# Patient Record
Sex: MALE | Race: WHITE | Employment: FULL TIME | URBAN - METROPOLITAN AREA
[De-identification: names, ages, dates, MRNs, and addresses within clinical notes are randomized per-mention and may not be internally consistent; named-entity substitution may affect disease eponyms.]

---

## 2017-01-09 ENCOUNTER — OFFICE VISIT (OUTPATIENT)
Dept: INTERNAL MEDICINE CLINIC | Facility: CLINIC | Age: 47
End: 2017-01-09

## 2017-01-09 VITALS
RESPIRATION RATE: 18 BRPM | BODY MASS INDEX: 30.31 KG/M2 | TEMPERATURE: 97.8 F | HEART RATE: 87 BPM | HEIGHT: 68 IN | WEIGHT: 200 LBS | DIASTOLIC BLOOD PRESSURE: 78 MMHG | SYSTOLIC BLOOD PRESSURE: 126 MMHG

## 2017-01-09 DIAGNOSIS — J01.90 ACUTE NON-RECURRENT SINUSITIS, UNSPECIFIED LOCATION: Primary | ICD-10-CM

## 2017-01-09 RX ORDER — GUAIFENESIN 600 MG/1
600 TABLET, EXTENDED RELEASE ORAL 2 TIMES DAILY
COMMUNITY
Start: 2017-01-09 | End: 2017-04-17

## 2017-01-09 RX ORDER — AMOXICILLIN 875 MG/1
875 TABLET, FILM COATED ORAL 2 TIMES DAILY
Qty: 14 TAB | Refills: 0 | Status: SHIPPED | OUTPATIENT
Start: 2017-01-09 | End: 2017-04-17

## 2017-01-09 NOTE — PROGRESS NOTES
Chief Complaint   Patient presents with    Sinus Pain     having some chest tightness, sinus pain, pain when he laid on his back. Pain when tryint to inhale/exhale not sure if he has pneumonia or bronchitis. . Elevated BP. 1. Have you been to the ER, urgent care clinic since your last visit? Hospitalized since your last visit? No    2. Have you seen or consulted any other health care providers outside of the [address] [place] [address] since your last visit? Include any pap smears or colon screening.  No

## 2017-01-09 NOTE — PROGRESS NOTES
HISTORY OF PRESENT ILLNESS  Felisa Bui is a 55 y.o. male. HPI  1) Sick since 12/26/16. Feeling \"tight\" - slightly better with humidifier. Rested all weekend. Chills/fatigue. Sinus pressure on New Year's Day. BP at home 155/90s for a few hours that day after taking 1 prednisone (left over from RA flare per Rheum). Orlene Asbury once yesterday. 2) BP at home has been ~132/82. When drinking, the day after:141/90. Has cut down on alcohol because trying to lose weight. Now 1-3x/week. Changed jobs - now working in [employer] at [employer] - very happy with this change. Much less stress. Wt Readings from Last 3 Encounters:   01/09/17 200 lb (90.7 kg)   09/20/16 200 lb (90.7 kg)   09/14/16 200 lb (90.7 kg)        Lab Results   Component Value Date/Time    Hemoglobin A1c 6.1 03/29/2016 08:12 AM    Hemoglobin A1c (POC) 6.0 09/14/2016 09:44 AM     Lab Results   Component Value Date/Time    Glucose 104 09/20/2016 11:19 AM       Review of Systems   Constitutional: Positive for malaise/fatigue. Negative for fever. HENT: Positive for congestion. Negative for ear pain. Respiratory: Positive for cough, sputum production and shortness of breath. Neurological: Positive for headaches. Endo/Heme/Allergies: Negative for environmental allergies. Physical Exam   Constitutional: He is oriented to person, place, and time. He appears well-developed and well-nourished. No distress. HENT:   Head: Normocephalic and atraumatic. Mouth/Throat: Oropharynx is clear and moist.   Bilateral TMs with fluid. No erythema. Nasal mucosa red, inflamed. Eyes: Conjunctivae are normal.   Neck: Neck supple. Cardiovascular: Normal rate, regular rhythm and normal heart sounds. Pulmonary/Chest: Effort normal and breath sounds normal.   Lymphadenopathy:     He has no cervical adenopathy. Neurological: He is alert and oriented to person, place, and time. Skin: Skin is warm and dry.    Nursing note and vitals reviewed. ASSESSMENT and PLAN    ICD-10-CM ICD-9-CM    1. Acute non-recurrent sinusitis, unspecified location J01.90 461.9 amoxicillin (AMOXIL) 875 mg tablet      guaiFENesin ER (MUCINEX) 600 mg ER tablet   2. Elevated blood pressure I10 401.9 Improved with decrease in alcohol consumption. Continue monitoring at home.

## 2017-01-14 DIAGNOSIS — E78.00 HYPERCHOLESTEREMIA: ICD-10-CM

## 2017-01-17 ENCOUNTER — PATIENT MESSAGE (OUTPATIENT)
Dept: INTERNAL MEDICINE CLINIC | Facility: CLINIC | Age: 47
End: 2017-01-17

## 2017-01-17 RX ORDER — LEVOFLOXACIN 500 MG/1
500 TABLET, FILM COATED ORAL DAILY
Qty: 10 TAB | Refills: 0 | Status: SHIPPED | OUTPATIENT
Start: 2017-01-17 | End: 2017-04-17

## 2017-01-23 ENCOUNTER — HOSPITAL ENCOUNTER (OUTPATIENT)
Dept: GENERAL RADIOLOGY | Age: 47
Discharge: HOME | End: 2017-01-23
Attending: PHYSICIAN ASSISTANT
Payer: COMMERCIAL

## 2017-01-23 ENCOUNTER — TELEPHONE (OUTPATIENT)
Dept: INTERNAL MEDICINE CLINIC | Facility: CLINIC | Age: 47
End: 2017-01-23

## 2017-01-23 DIAGNOSIS — R05.9 COUGH: ICD-10-CM

## 2017-01-23 DIAGNOSIS — J20.9 BRONCHOSPASM WITH BRONCHITIS, ACUTE: ICD-10-CM

## 2017-01-23 DIAGNOSIS — R05.9 COUGH: Primary | ICD-10-CM

## 2017-01-23 DIAGNOSIS — J98.01 BRONCHOSPASM: ICD-10-CM

## 2017-01-23 PROCEDURE — 71020 XR CHEST PA LAT: CPT

## 2017-01-23 RX ORDER — BUDESONIDE AND FORMOTEROL FUMARATE DIHYDRATE 160; 4.5 UG/1; UG/1
2 AEROSOL RESPIRATORY (INHALATION) 2 TIMES DAILY
Qty: 1 INHALER | Refills: 1 | Status: SHIPPED | OUTPATIENT
Start: 2017-01-23 | End: 2017-11-20

## 2017-01-23 RX ORDER — ALBUTEROL SULFATE 90 UG/1
1 AEROSOL, METERED RESPIRATORY (INHALATION)
Qty: 1 INHALER | Refills: 1 | Status: SHIPPED | OUTPATIENT
Start: 2017-01-23 | End: 2017-11-20

## 2017-01-23 NOTE — TELEPHONE ENCOUNTER
Replied to My Chart message, as that was easier for detailing suggestion for prednisone dosing. Will call if I don't hear back from him after lunch.

## 2017-01-23 NOTE — TELEPHONE ENCOUNTER
Patient is calling this morning to inform AVINASH Perea that he is not feeling better. Patient would like to speak with AVINASH Perea. Patient can be reached @[phone].

## 2017-01-23 NOTE — TELEPHONE ENCOUNTER
Called pt. He is coughing deeply and frequently on the phone. He has not been to work since Jan 6th with the exception of Jan 20th. I asked him to get a CXR today. He agrees to do a taper of prednisone (he already has the prednisone at home), and I sent in albuterol and Symbicort. F/U Thurs/Fri INI.

## 2017-04-17 ENCOUNTER — OFFICE VISIT (OUTPATIENT)
Dept: INTERNAL MEDICINE CLINIC | Facility: CLINIC | Age: 47
End: 2017-04-17

## 2017-04-17 VITALS
RESPIRATION RATE: 18 BRPM | BODY MASS INDEX: 30.58 KG/M2 | TEMPERATURE: 97.4 F | DIASTOLIC BLOOD PRESSURE: 82 MMHG | SYSTOLIC BLOOD PRESSURE: 127 MMHG | HEIGHT: 68 IN | WEIGHT: 201.8 LBS | OXYGEN SATURATION: 98 % | HEART RATE: 79 BPM

## 2017-04-17 DIAGNOSIS — J30.2 SEASONAL ALLERGIC RHINITIS, UNSPECIFIED ALLERGIC RHINITIS TRIGGER: ICD-10-CM

## 2017-04-17 DIAGNOSIS — R06.83 SNORING: ICD-10-CM

## 2017-04-17 DIAGNOSIS — S09.92XS NASAL TRAUMA, SEQUELA: ICD-10-CM

## 2017-04-17 DIAGNOSIS — R73.09 ELEVATED HEMOGLOBIN A1C: ICD-10-CM

## 2017-04-17 DIAGNOSIS — Z00.00 ANNUAL PHYSICAL EXAM: Primary | ICD-10-CM

## 2017-04-17 DIAGNOSIS — Z23 ENCOUNTER FOR IMMUNIZATION: ICD-10-CM

## 2017-04-17 LAB — HBA1C MFR BLD HPLC: 5.7 %

## 2017-04-17 NOTE — PROGRESS NOTES
HISTORY OF PRESENT ILLNESS  Estuardo Diaz is a 55 y.o. male. HPI  1) CE today. Fasting for labs. Pneumovax 23 due. Pt agrees. Today is day 92 without smoking! Alcohol 1-2x/week 8-10 drinks. Hasn't been to the gym for several months, but tracking steps. Getting 7-10 thousand steps/day. Wt Readings from Last 3 Encounters:   04/17/17 201 lb 12.8 oz (91.5 kg)   01/17/17 200 lb (90.7 kg)   01/09/17 200 lb (90.7 kg)     2) Snoring regularly. Believes he may have sleep apnea vs. Septum deviation s/p nasal trauma last year. Would like to start with ENT referral and then see Sleep Medicine if no better. Plans to try to lose weight to decrease risk of sleep apnea. Review of Systems   Constitutional: Negative for fever and weight loss. HENT: Positive for congestion. Negative for hearing loss and sore throat. Eyes: Negative for blurred vision. Respiratory: Negative for cough and shortness of breath. Cardiovascular: Negative for chest pain, palpitations and leg swelling. Gastrointestinal: Negative for abdominal pain, blood in stool, constipation, diarrhea, heartburn, melena, nausea and vomiting. Genitourinary: Negative for dysuria, frequency, hematuria and urgency. Musculoskeletal: Positive for joint pain. Negative for back pain and neck pain. Joint pain in ankles, low back (minor) with RA   Neurological: Negative for dizziness, seizures, loss of consciousness, weakness and headaches. Endo/Heme/Allergies: Positive for environmental allergies. Psychiatric/Behavioral: Negative for depression and substance abuse. The patient is not nervous/anxious and does not have insomnia. Physical Exam   Constitutional: He is oriented to person, place, and time. He appears well-developed and well-nourished. No distress. HENT:   Head: Normocephalic and atraumatic.    Right Ear: External ear normal.   Left Ear: External ear normal.   Mouth/Throat: Oropharynx is clear and moist. No oropharyngeal exudate. Nasal septum appears slightly deviated on gross exam.   Nasal mucosa pale, inflamed. Eyes: Conjunctivae are normal.   Neck: Neck supple. No JVD present. No thyromegaly present. Cardiovascular: Normal rate, regular rhythm and normal heart sounds. Pulmonary/Chest: Effort normal and breath sounds normal. No respiratory distress. Abdominal: Soft. Bowel sounds are normal. He exhibits no distension and no mass. There is no tenderness. There is no rebound and no guarding. Musculoskeletal: He exhibits no edema. Lymphadenopathy:     He has no cervical adenopathy. Neurological: He is alert and oriented to person, place, and time. Skin: Skin is warm and dry. Psychiatric: He has a normal mood and affect. His behavior is normal. Judgment and thought content normal.   Nursing note and vitals reviewed. ASSESSMENT and PLAN    ICD-10-CM ICD-9-CM    1. Annual physical exam Z00.00 V70.0 Labs as ordered previously. 2. Elevated hemoglobin A1c R73.09 790.29 AMB POC HEMOGLOBIN A1C   3. Snoring R06.83 786.09 SLEEP MEDICINE REFERRAL  Discussed risks of untreated sleep apnea including stroke. Pt notes understanding. REFERRAL TO ENT-OTOLARYNGOLOGY   4. Seasonal allergic rhinitis, unspecified allergic rhinitis trigger J30.2 477.9 OTC Flonase/Nasacort. 5. Nasal trauma, sequela S09. 92XS 908.9 REFERRAL TO ENT-OTOLARYNGOLOGY   6.  Encounter for immunization Z23 V03.89 PNEUMOCOCCAL POLYSACCHARIDE VACCINE, 23-VALENT, ADULT OR IMMUNOSUPPRESSED PT DOSE,

## 2017-04-17 NOTE — PROGRESS NOTES
Room 7    Chief Complaint   Patient presents with    Complete Physical     Patient states he is here for annual physical and is fasting     1. Have you been to the ER, urgent care clinic since your last visit? Hospitalized since your last visit? No    2. Have you seen or consulted any other health care providers outside of the [address] [place] [address] since your last visit? Include any pap smears or colon screening.  No     Health Maintenance Due   Topic Date Due    Pneumococcal 19-64 Medium Risk (1 of 1 - PPSV23) 11[birth year]04/1989

## 2017-04-17 NOTE — MR AVS SNAPSHOT
Visit Information Date & Time Provider Department Dept. Phone Encounter #  
 4/17/2017  8:15 AM Ramiro Holliday PA-C [facility] [phone] [id number] Follow-up Instructions Return in about 6 months (around 10/17/2017) for 30 minute follow up weight, blood pressure, snoring. Your Appointments 10/17/2017  8:45 AM  
ESTABLISHED PATIENT with MD Angi Greene TURNER, [address] [place] [address] ([facility]) Appt Note: 9 month f/u  
 [id number] [id number]  
[phone]  
  
   
 [id number] [address] [id number] Upcoming Health Maintenance Date Due Pneumococcal 19-64 Medium Risk (1 of 1 - PPSV23) 11/4/1989 DTaP/Tdap/Td series (2 - Td) 3/7/2026 Allergies as of 4/17/2017  Review Complete On: 4/17/2017 By: Kinsey Saleh LPN No Known Allergies Current Immunizations  Reviewed on 10/7/2013 Name Date Influenza Vaccine 11/10/2014, 10/7/2013  9:55 AM  
 Influenza Vaccine (Quad) PF 9/14/2016, 3/7/2016 Pneumococcal Polysaccharide (PPSV-23)  Incomplete TB Skin Test (PPD) Intradermal 9/20/2016,  Incomplete, 10/7/2013 10:09 AM  
 Tdap 3/7/2016 Not reviewed this visit You Were Diagnosed With   
  
 Codes Comments Annual physical exam    -  Primary ICD-10-CM: Z00.00 ICD-9-CM: V70.0 Elevated hemoglobin A1c     ICD-10-CM: R73.09 
ICD-9-CM: 790.29 Snoring     ICD-10-CM: R06.83 
ICD-9-CM: 786.09 Seasonal allergic rhinitis, unspecified allergic rhinitis trigger     ICD-10-CM: J30.2 ICD-9-CM: 477.9 Nasal trauma, sequela     ICD-10-CM: S09. 92XS 
ICD-9-CM: 908.9 Encounter for immunization     ICD-10-CM: L65 ICD-9-CM: V03.89 Vitals BP Pulse Temp Resp Height(growth percentile) Weight(growth percentile) 127/82 (BP 1 Location: Left arm, BP Patient Position: Sitting) 79 97.4 °F (36.3 °C) (Oral) 18 5' 8\" (1.727 m) 201 lb 12.8 oz (91.5 kg) SpO2 BMI Smoking Status 98% 30.68 kg/m2 Former Smoker Vitals History BMI and BSA Data Body Mass Index Body Surface Area  
 30.68 kg/m 2 2.1 m 2 Preferred Pharmacy Pharmacy Name Phone [id number] [place] - [address] AT [address] [facility] [phone] Your Updated Medication List  
  
   
This list is accurate as of: 4/17/17  8:57 AM.  Always use your most recent med list.  
  
  
  
  
 albuterol 90 mcg/actuation inhaler Commonly known as:  PROVENTIL HFA, VENTOLIN HFA, PROAIR HFA Take 1 Puff by inhalation every four (4) hours as needed for Shortness of Breath. budesonide-formoterol 160-4.5 mcg/actuation HFA inhaler Commonly known as:  SYMBICORT Take 2 Puffs by inhalation two (2) times a day.  
  
 etanercept 50 mg/mL (0.98 mL) injection Commonly known as:  ENBREL SURECLICK  
3.75 mL by SubCUTAneous route every seven (7) days. FISH  mg Cap Generic drug:  Omega-3 Fatty Acids Take  by mouth.  
  
 melatonin Tab tablet Take  by mouth nightly. multivitamin tablet Commonly known as:  ONE A DAY Take 1 Tab by mouth daily. We Performed the Following AMB POC HEMOGLOBIN A1C [73426 CPT(R)] PNEUMOCOCCAL POLYSACCHARIDE VACCINE, 23-VALENT, ADULT OR IMMUNOSUPPRESSED PT DOSE, [50652 CPT(R)] REFERRAL TO ENT-OTOLARYNGOLOGY [QKU14 Custom] Comments:  
 Chelsea Lowe M.D. 
[address] [facility] [address] [place] [address] Phone: [phone] SLEEP MEDICINE REFERRAL [ZQE266 Custom] Comments:  
 Or  
Dr. Caleb Harvey [facility] [address] [facility]"[employer] [place] [address] 
[phone] Follow-up Instructions Return in about 6 months (around 10/17/2017) for 30 minute follow up weight, blood pressure, snoring. Referral Information  Referral ID Referred By Referred To  
  
 [id number] Eladio Dempsey MD   
 Dima Garrett [address] Phone: [phone] Fax: [phone] Visits Status Start Date End Date 1 New Request 4/17/17 4/17/18 If your referral has a status of pending review or denied, additional information will be sent to support the outcome of this decision. Referral ID Referred By Referred To  
 [id number] [facility]  
   [address] [facility] [address] David Ramirez [facility], [address] [place] [address] Visits Status Start Date End Date 1 New Request 4/17/17 4/17/18 If your referral has a status of pending review or denied, additional information will be sent to support the outcome of this decision. Introducing [facility] & HEALTH SERVICES! Dear Toña Fitzpatrick: Thank you for requesting a SynapSense account. Our records indicate that you already have an active SynapSense account. You can access your account anytime at [email or web address] "[email or web address] Did you know that you can access your hospital and ER discharge instructions at any time in SynapSense? You can also review all of your test results from your hospital stay or ER visit. Additional Information If you have questions, please visit the Frequently Asked Questions section of the SynapSense website at [email or web address] Remember, SynapSense is NOT to be used for urgent needs. For medical emergencies, dial 911. Now available from your iPhone and Android! Please provide this summary of care documentation to your next provider. Your primary care clinician is listed as Jami Gustafson. If you have any questions after today's visit, please call [phone].

## 2017-04-17 NOTE — LETTER
4/17/2017 9:14 AM 
 
 Ceet[address] Maryse. 
[address] 
[address] [facility] [address] [address] Dear Tierney Serra.: 
 
Please find your most recent results below. Resulted Orders AMB POC HEMOGLOBIN A1C Result Value Ref Range Hemoglobin A1c (POC) 5.7 % RECOMMENDATIONS: 
This A1c is MUCH improved from last time! Good! Please call me if you have any questions: [phone] Sincerely, Ben Britton PA-C

## 2017-04-18 LAB
ALBUMIN SERPL-MCNC: 4.1 G/DL (ref 3.5–5.5)
ALBUMIN/GLOB SERPL: 1.2 {RATIO} (ref 1.2–2.2)
ALP SERPL-CCNC: 84 IU/L (ref 39–117)
ALT SERPL-CCNC: 25 IU/L (ref 0–44)
AST SERPL-CCNC: 25 IU/L (ref 0–40)
BILIRUB SERPL-MCNC: 0.4 MG/DL (ref 0–1.2)
BUN SERPL-MCNC: 19 MG/DL (ref 6–24)
BUN/CREAT SERPL: 20 (ref 9–20)
CALCIUM SERPL-MCNC: 8.9 MG/DL (ref 8.7–10.2)
CHLORIDE SERPL-SCNC: 99 MMOL/L (ref 96–106)
CHOLEST SERPL-MCNC: 191 MG/DL (ref 100–199)
CO2 SERPL-SCNC: 20 MMOL/L (ref 18–29)
CREAT SERPL-MCNC: 0.96 MG/DL (ref 0.76–1.27)
GLOBULIN SER CALC-MCNC: 3.4 G/DL (ref 1.5–4.5)
GLUCOSE SERPL-MCNC: 97 MG/DL (ref 65–99)
HDLC SERPL-MCNC: 49 MG/DL
LDLC SERPL CALC-MCNC: 116 MG/DL (ref 0–99)
POTASSIUM SERPL-SCNC: 4.4 MMOL/L (ref 3.5–5.2)
PROT SERPL-MCNC: 7.5 G/DL (ref 6–8.5)
SODIUM SERPL-SCNC: 138 MMOL/L (ref 134–144)
TRIGL SERPL-MCNC: 132 MG/DL (ref 0–149)
VLDLC SERPL CALC-MCNC: 26 MG/DL (ref 5–40)

## 2017-04-18 NOTE — PROGRESS NOTES
Iris Harness! LDL (\"Bad Cholesterol\") is slightly high. It's creeping up from last time (it was so good last time!). Decrease fried/fatty foods, red meat, butter, oils, and increase cardio exercise. Otherwise, good lab report! Nice seeing you!    Lupillo Echevarria

## 2017-11-20 ENCOUNTER — OFFICE VISIT (OUTPATIENT)
Dept: INTERNAL MEDICINE CLINIC | Facility: CLINIC | Age: 47
End: 2017-11-20

## 2017-11-20 VITALS
BODY MASS INDEX: 32.43 KG/M2 | SYSTOLIC BLOOD PRESSURE: 131 MMHG | RESPIRATION RATE: 18 BRPM | HEART RATE: 65 BPM | TEMPERATURE: 97 F | OXYGEN SATURATION: 96 % | WEIGHT: 214 LBS | HEIGHT: 68 IN | DIASTOLIC BLOOD PRESSURE: 81 MMHG

## 2017-11-20 DIAGNOSIS — R73.09 ELEVATED HEMOGLOBIN A1C: ICD-10-CM

## 2017-11-20 DIAGNOSIS — M06.9 RHEUMATOID ARTHRITIS, INVOLVING UNSPECIFIED SITE, UNSPECIFIED RHEUMATOID FACTOR PRESENCE: ICD-10-CM

## 2017-11-20 DIAGNOSIS — Z79.899 LONG-TERM USE OF HIGH-RISK MEDICATION: ICD-10-CM

## 2017-11-20 DIAGNOSIS — E66.9 OBESITY (BMI 30-39.9): ICD-10-CM

## 2017-11-20 DIAGNOSIS — R03.0 ELEVATED BP WITHOUT DIAGNOSIS OF HYPERTENSION: ICD-10-CM

## 2017-11-20 DIAGNOSIS — R06.83 SNORING: Primary | ICD-10-CM

## 2017-11-20 DIAGNOSIS — J30.9 CHRONIC ALLERGIC RHINITIS, UNSPECIFIED SEASONALITY, UNSPECIFIED TRIGGER: ICD-10-CM

## 2017-11-20 DIAGNOSIS — Z23 ENCOUNTER FOR IMMUNIZATION: ICD-10-CM

## 2017-11-20 DIAGNOSIS — J34.2 DEVIATED SEPTUM: ICD-10-CM

## 2017-11-20 DIAGNOSIS — E78.00 HYPERCHOLESTEREMIA: ICD-10-CM

## 2017-11-20 NOTE — PROGRESS NOTES
HISTORY OF PRESENT ILLNESS  Bharat Perez is a 52 y.o. male. Chief Complaint   Patient presents with    Well Male     Patient desires a follow up. Requests flu vaccine. Patient is fasting. Room 7     HPI  1) Hx elevated BP - has improved since quitting smoking! Pt is checking at home at BP is staying in the 130/80 range. 2) Hx elevated A1c - has gained 13 lbs in the past 7 months. Pt relates this to food tasting better now that he has quit smoking x 10 months. Lab Results   Component Value Date/Time    Hemoglobin A1c 6.1 03/29/2016 08:12 AM    Hemoglobin A1c (POC) 5.7 04/17/2017 08:48 AM     Wt Readings from Last 3 Encounters:   11/20/17 214 lb (97.1 kg)   04/17/17 201 lb 12.8 oz (91.5 kg)   01/17/17 200 lb (90.7 kg)     Started exercising last week. Planning to lose weight. Goal: fit into old suit by Kenney's Day. Exercising [employer] workout 3x/week. Alcohol - less alcohol on \"school nights\" and less on weekends than previously. 3) Fasting for labs today:   Lab Results   Component Value Date/Time    Cholesterol, total 191 04/17/2017 08:35 AM    HDL Cholesterol 49 04/17/2017 08:35 AM    LDL, calculated 116 04/17/2017 08:35 AM    VLDL, calculated 26 04/17/2017 08:35 AM    Triglyceride 132 04/17/2017 08:35 AM     4) Snoring regularly s/p nose injury years ago. Would like to see ENT before sleep med. Review of Systems   Respiratory: Negative for shortness of breath. Cardiovascular: Negative for chest pain and palpitations. Neurological: Negative for dizziness, loss of consciousness and weakness. Psychiatric/Behavioral: Negative for depression. The patient has insomnia. The patient is not nervous/anxious. Snoring regularly - has sleep med referral.        Physical Exam   Constitutional: He is oriented to person, place, and time. He appears well-developed and well-nourished. No distress. HENT:   Head: Normocephalic and atraumatic.    Mouth/Throat: Oropharynx is clear and moist.   Bilateral TMs with fluid. No erythema. Nasal mucosa pale, inflamed. Septum is deviated on gross exam.    Eyes: Conjunctivae are normal.   Neck: Neck supple. No JVD present. Cardiovascular: Normal rate, regular rhythm and normal heart sounds. Pulmonary/Chest: Effort normal and breath sounds normal. No respiratory distress. Musculoskeletal: He exhibits no edema. Lymphadenopathy:     He has no cervical adenopathy. Neurological: He is alert and oriented to person, place, and time. Skin: Skin is warm and dry. Psychiatric: He has a normal mood and affect. His behavior is normal. Judgment and thought content normal.   Nursing note and vitals reviewed. ASSESSMENT and PLAN    ICD-10-CM ICD-9-CM    1. Snoring R06.83 786.09 REFERRAL TO ENT-OTOLARYNGOLOGY   2. Deviated septum J34.2 470 REFERRAL TO ENT-OTOLARYNGOLOGY   3. Elevated hemoglobin A1c T23.56 059.74 METABOLIC PANEL, COMPREHENSIVE      HEMOGLOBIN A1C W/O EAG   4. Rheumatoid arthritis, involving unspecified site, unspecified rheumatoid factor presence ([facility] M06.9 714.0 Continue routine follow ups with rheum. 5. Obesity (BMI 30-39. 9) E66.9 278.00 Discussed diet & exercise. 6. Hypercholesteremia E78.00 272.0 LIPID PANEL   7. Elevated BP without diagnosis of hypertension R03.0 796.2 Improved with smoking cessation! 8. Long-term use of high-risk medication Z79.899 V58.69 CBC WITH AUTOMATED DIFF   9.  Chronic allergic rhinitis, unspecified seasonality, unspecified trigger J30.9 477.9 REFERRAL TO ENT-OTOLARYNGOLOGY   10. Encounter for immunization Z23 V03.89 INFLUENZA VIRUS VAC QUAD,SPLIT,PRESV FREE SYRINGE IM

## 2017-11-20 NOTE — PROGRESS NOTES
Chief Complaint   Patient presents with    Well Male     Patient desires a follow up. Requests flu vaccine. Patient is fasting. Room 7     1. Have you been to the ER, urgent care clinic since your last visit? Hospitalized since your last visit? No    2. Have you seen or consulted any other health care providers outside of the [address] [place] [address] since your last visit? Include any pap smears or colon screening.  No     Health Maintenance Due   Topic Date Due    Influenza Age 5 to Adult  08/01/2017

## 2017-11-20 NOTE — MR AVS SNAPSHOT
Visit Information Date & Time Provider Department Dept. Phone Encounter #  
 11/20/2017  9:00 AM Matty Barber, [address] Internal Medicine [phone] [id number] Follow-up Instructions Return in about 6 months (around 5/20/2018) for 30 minute follow up BP, Cholesterol, Weight, A1c. Upcoming Health Maintenance Date Due Influenza Age 5 to Adult 8/1/2017 DTaP/Tdap/Td series (2 - Td) 3/7/2026 Allergies as of 11/20/2017  Review Complete On: 11/20/2017 By: Anitra Adair LPN No Known Allergies Current Immunizations  Reviewed on 4/17/2017 Name Date Influenza Vaccine 11/10/2014, 10/7/2013  9:55 AM  
 Influenza Vaccine (Quad) PF  Incomplete, 9/14/2016, 3/7/2016 Pneumococcal Polysaccharide (PPSV-23) 4/17/2017 TB Skin Test (PPD) Intradermal 9/20/2016,  Incomplete, 10/7/2013 10:09 AM  
 Tdap 3/7/2016 Not reviewed this visit You Were Diagnosed With   
  
 Codes Comments Snoring    -  Primary ICD-10-CM: R06.83 
ICD-9-CM: 786.09 Deviated septum     ICD-10-CM: J34.2 ICD-9-CM: 951 Elevated hemoglobin A1c     ICD-10-CM: R73.09 
ICD-9-CM: 790.29 Rheumatoid arthritis, involving unspecified site, unspecified rheumatoid factor presence ([facility]     ICD-10-CM: M06.9 ICD-9-CM: 714.0 Obesity (BMI 30-39. 9)     ICD-10-CM: E66.9 ICD-9-CM: 278.00 Hypercholesteremia     ICD-10-CM: E78.00 ICD-9-CM: 272.0 Elevated BP without diagnosis of hypertension     ICD-10-CM: R03.0 ICD-9-CM: 796.2 Long-term use of high-risk medication     ICD-10-CM: Z79.899 ICD-9-CM: V58.69 Chronic allergic rhinitis, unspecified seasonality, unspecified trigger     ICD-10-CM: J30.9 ICD-9-CM: 477.9 Encounter for immunization     ICD-10-CM: G72 ICD-9-CM: V03.89 Vitals BP Pulse Temp Resp Height(growth percentile) Weight(growth percentile)  131/81 (BP 1 Location: Left arm, BP Patient Position: Sitting) 65 97 °F (36.1 °C) (Oral) 18 5' 8\" (1.727 m) 214 lb (97.1 kg) SpO2 BMI Smoking Status 96% 32.54 kg/m2 Former Smoker Vitals History BMI and BSA Data Body Mass Index Body Surface Area 32.54 kg/m 2 2.16 m 2 Preferred Pharmacy Pharmacy Name Phone [facility] [address] Your Updated Medication List  
  
   
This list is accurate as of: 11/20/17  9:35 AM.  Always use your most recent med list.  
  
  
  
  
 ENBREL SURECLICK 50 mg/mL (8.41 mL) injection Generic drug:  etanercept INJECT 50 MG SUBCUTANEOUSLY ONCE EVERY SEVEN DAYS FISH  mg Cap Generic drug:  Omega-3 Fatty Acids Take  by mouth.  
  
 melatonin Tab tablet Take  by mouth nightly. multivitamin tablet Commonly known as:  ONE A DAY Take 1 Tab by mouth daily. We Performed the Following CBC WITH AUTOMATED DIFF [35942 CPT(R)] HEMOGLOBIN A1C W/O EAG [93800 CPT(R)] INFLUENZA VIRUS VAC QUAD,SPLIT,PRESV FREE SYRINGE IM [id number] CPT(R)] LIPID PANEL [48348 CPT(R)] METABOLIC PANEL, COMPREHENSIVE [27997 CPT(R)] REFERRAL TO ENT-OTOLARYNGOLOGY [UMW30 Custom] Comments:  
 Deacon Lowe M.D. 
[address] [facility] [address] [place] [address] Phone: [phone] Follow-up Instructions Return in about 6 months (around 5/20/2018) for 30 minute follow up BP, Cholesterol, Weight, A1c. Referral Information Referral ID Referred By Referred To  
  
 [id number] Dmitry Zuniga MD [address] [facility] [address], [address] Phone: [phone] Fax: [phone] Visits Status Start Date End Date 1 New Request 11/20/17 11/20/18 If your referral has a status of pending review or denied, additional information will be sent to support the outcome of this decision. Introducing [facility]! Dear Thalia Fraga: Thank you for requesting a MiracleC[employer] account. Our records indicate that you already have an active MiracleCord account. You can access your account anytime at [email or web address] Did you know that you can access your hospital and ER discharge instructions at any time in MiracleC[employer]? You can also review all of your test results from your hospital stay or ER visit. Additional Information If you have questions, please visit the Frequently Asked Questions section of the Miracle[employer] website at [email or web address] Remember, MiracleCord is NOT to be used for urgent needs. For medical emergencies, dial 911. Now available from your iPhone and Android! Please provide this summary of care documentation to your next provider. Your primary care clinician is listed as Tashi Rajput. If you have any questions after today's visit, please call [phone].

## 2017-11-21 LAB
ALBUMIN SERPL-MCNC: 3.9 G/DL (ref 3.5–5.5)
ALBUMIN/GLOB SERPL: 1.1 {RATIO} (ref 1.2–2.2)
ALP SERPL-CCNC: 88 IU/L (ref 39–117)
ALT SERPL-CCNC: 26 IU/L (ref 0–44)
AST SERPL-CCNC: 19 IU/L (ref 0–40)
BASOPHILS # BLD AUTO: 0 X10E3/UL (ref 0–0.2)
BASOPHILS NFR BLD AUTO: 1 %
BILIRUB SERPL-MCNC: 0.4 MG/DL (ref 0–1.2)
BUN SERPL-MCNC: 17 MG/DL (ref 6–24)
BUN/CREAT SERPL: 20 (ref 9–20)
CALCIUM SERPL-MCNC: 9.1 MG/DL (ref 8.7–10.2)
CHLORIDE SERPL-SCNC: 100 MMOL/L (ref 96–106)
CHOLEST SERPL-MCNC: 166 MG/DL (ref 100–199)
CO2 SERPL-SCNC: 25 MMOL/L (ref 18–29)
CREAT SERPL-MCNC: 0.83 MG/DL (ref 0.76–1.27)
EOSINOPHIL # BLD AUTO: 0.3 X10E3/UL (ref 0–0.4)
EOSINOPHIL NFR BLD AUTO: 4 %
ERYTHROCYTE [DISTWIDTH] IN BLOOD BY AUTOMATED COUNT: 13.7 % (ref 12.3–15.4)
GFR SERPLBLD CREATININE-BSD FMLA CKD-EPI: 105 ML/MIN/1.73
GFR SERPLBLD CREATININE-BSD FMLA CKD-EPI: 121 ML/MIN/1.73
GLOBULIN SER CALC-MCNC: 3.4 G/DL (ref 1.5–4.5)
GLUCOSE SERPL-MCNC: 102 MG/DL (ref 65–99)
HBA1C MFR BLD: 6.1 % (ref 4.8–5.6)
HCT VFR BLD AUTO: 41.1 % (ref 37.5–51)
HDLC SERPL-MCNC: 43 MG/DL
HGB BLD-MCNC: 13.8 G/DL (ref 12.6–17.7)
IMM GRANULOCYTES # BLD: 0 X10E3/UL (ref 0–0.1)
IMM GRANULOCYTES NFR BLD: 0 %
LDLC SERPL CALC-MCNC: 84 MG/DL (ref 0–99)
LYMPHOCYTES # BLD AUTO: 1.6 X10E3/UL (ref 0.7–3.1)
LYMPHOCYTES NFR BLD AUTO: 27 %
MCH RBC QN AUTO: 30.5 PG (ref 26.6–33)
MCHC RBC AUTO-ENTMCNC: 33.6 G/DL (ref 31.5–35.7)
MCV RBC AUTO: 91 FL (ref 79–97)
MONOCYTES # BLD AUTO: 0.9 X10E3/UL (ref 0.1–0.9)
MONOCYTES NFR BLD AUTO: 15 %
NEUTROPHILS # BLD AUTO: 3.3 X10E3/UL (ref 1.4–7)
NEUTROPHILS NFR BLD AUTO: 53 %
PLATELET # BLD AUTO: 270 X10E3/UL (ref 150–379)
POTASSIUM SERPL-SCNC: 4.2 MMOL/L (ref 3.5–5.2)
PROT SERPL-MCNC: 7.3 G/DL (ref 6–8.5)
RBC # BLD AUTO: 4.53 X10E6/UL (ref 4.14–5.8)
SODIUM SERPL-SCNC: 140 MMOL/L (ref 134–144)
TRIGL SERPL-MCNC: 194 MG/DL (ref 0–149)
VLDLC SERPL CALC-MCNC: 39 MG/DL (ref 5–40)
WBC # BLD AUTO: 6.1 X10E3/UL (ref 3.4–10.8)

## 2017-11-22 NOTE — PROGRESS NOTES
Marjorie Root! As expected, your Glucose/Cholesterol/A1c show that your diet has not been so great lately. I think the changes we discussed at your visit will make a significant improvement here. Good luck! Otherwise, your labs are perfect. You are not anemic, and your Liver enzymes, kidney function, electrolytes, and glucose (sugar) are all normal. Good! Have a Happy Thanksgiving!    Luis Petty

## 2018-03-21 ENCOUNTER — OFFICE VISIT (OUTPATIENT)
Dept: INTERNAL MEDICINE CLINIC | Facility: CLINIC | Age: 48
End: 2018-03-21

## 2018-03-21 VITALS
HEIGHT: 68 IN | OXYGEN SATURATION: 97 % | SYSTOLIC BLOOD PRESSURE: 116 MMHG | RESPIRATION RATE: 18 BRPM | WEIGHT: 215 LBS | TEMPERATURE: 98.8 F | HEART RATE: 81 BPM | BODY MASS INDEX: 32.58 KG/M2 | DIASTOLIC BLOOD PRESSURE: 79 MMHG

## 2018-03-21 DIAGNOSIS — R05.9 COUGH: ICD-10-CM

## 2018-03-21 DIAGNOSIS — J10.1 INFLUENZA B: Primary | ICD-10-CM

## 2018-03-21 LAB
FLUAV+FLUBV AG NOSE QL IA.RAPID: NEGATIVE POS/NEG
FLUAV+FLUBV AG NOSE QL IA.RAPID: POSITIVE POS/NEG
VALID INTERNAL CONTROL?: YES

## 2018-03-21 RX ORDER — ALBUTEROL SULFATE 90 UG/1
1 AEROSOL, METERED RESPIRATORY (INHALATION)
Qty: 1 INHALER | Refills: 0 | Status: SHIPPED | OUTPATIENT
Start: 2018-03-21 | End: 2018-11-26

## 2018-03-21 RX ORDER — OSELTAMIVIR PHOSPHATE 75 MG/1
75 CAPSULE ORAL 2 TIMES DAILY
Qty: 10 CAP | Refills: 0 | Status: SHIPPED | OUTPATIENT
Start: 2018-03-21 | End: 2018-03-26

## 2018-03-21 RX ORDER — AMOXICILLIN AND CLAVULANATE POTASSIUM 875; 125 MG/1; MG/1
1 TABLET, FILM COATED ORAL EVERY 12 HOURS
Qty: 20 TAB | Refills: 0 | Status: SHIPPED | OUTPATIENT
Start: 2018-03-21 | End: 2018-11-26

## 2018-03-21 NOTE — MR AVS SNAPSHOT
[address] [place] [address] 
[phone] Patient: Kim Alva. MRN: [id number] Visit Information Date & Time Provider Department Dept. Phone Encounter #  
 3/21/2018 10:45 AM Estrada Marx  [facility] Internal Medicine [phone] [id number] Follow-up Instructions Return if symptoms worsen or fail to improve, for follow up. Upcoming Health Maintenance Date Due DTaP/Tdap/Td series (2 - Td) 3/7/2026 Allergies as of 3/21/2018  Review Complete On: 3/21/2018 By: Breanna Lee LPN No Known Allergies Current Immunizations  Reviewed on 11/20/2017 Name Date Influenza Vaccine 11/10/2014, 10/7/2013  9:55 AM  
 Influenza Vaccine (Quad) PF 11/20/2017, 9/14/2016, 3/7/2016 Pneumococcal Polysaccharide (PPSV-23) 4/17/2017 TB Skin Test (PPD) Intradermal 9/20/2016,  Incomplete, 10/7/2013 10:09 AM  
 Tdap 3/7/2016 Not reviewed this visit You Were Diagnosed With   
  
 Codes Comments Influenza B    -  Primary ICD-10-CM: J10.1 ICD-9-CM: 632.2 Cough     ICD-10-CM: R05 ICD-9-CM: 674. 2 Vitals BP Pulse Temp Resp Height(growth percentile) Weight(growth percentile) 116/79 (BP 1 Location: Left arm, BP Patient Position: Sitting) 81 98.8 °F (37.1 °C) (Oral) 18 5' 8\" (1.727 m) 215 lb (97.5 kg) SpO2 BMI Smoking Status 97% 32.69 kg/m2 Former Smoker Vitals History BMI and BSA Data Body Mass Index Body Surface Area  
 32.69 kg/m 2 2.16 m 2 Preferred Pharmacy Pharmacy Name Phone [id number] [address] [facility] AT [address] [place] [address] [phone] Your Updated Medication List  
  
   
This list is accurate as of 3/21/18 11:29 AM.  Always use your most recent med list.  
  
  
  
  
 albuterol 90 mcg/actuation inhaler Commonly known as:  PROVENTIL HFA, VENTOLIN HFA, PROAIR HFA  
 Take 1 Puff by inhalation every four (4) hours as needed for Wheezing. amoxicillin-clavulanate 875-125 mg per tablet Commonly known as:  AUGMENTIN Take 1 Tab by mouth every twelve (12) hours. ENBREL SURECLICK 50 mg/mL (6.01 mL) injection Generic drug:  etanercept INJECT 50 MG SUBCUTANEOUSLY ONCE EVERY SEVEN DAYS FISH  mg Cap Generic drug:  Omega-3 Fatty Acids Take  by mouth.  
  
 melatonin Tab tablet Take  by mouth nightly. multivitamin tablet Commonly known as:  ONE A DAY Take 1 Tab by mouth daily. oseltamivir 75 mg capsule Commonly known as:  TAMIFLU Take 1 Cap by mouth two (2) times a day for 5 days. Prescriptions Sent to Pharmacy Refills  
 amoxicillin-clavulanate (AUGMENTIN) 875-125 mg per tablet 0 Sig: Take 1 Tab by mouth every twelve (12) hours. Class: Normal  
 Pharmacy: [facility]/pharmacy #[id number] AT [address] [place] [address] Ph #: [phone] Route: Oral  
 oseltamivir (TAMIFLU) 75 mg capsule 0 Sig: Take 1 Cap by mouth two (2) times a day for 5 days. Class: Normal  
 Pharmacy: [facility]/pharmacy #[id number] AT [address] [place] [address] Ph #: [phone] Route: Oral  
 albuterol (PROVENTIL HFA, VENTOLIN HFA, PROAIR HFA) 90 mcg/actuation inhaler 0 Sig: Take 1 Puff by inhalation every four (4) hours as needed for Wheezing. Class: Normal  
 Pharmacy: [facility]/pharmacy #[id number] AT [address] [place] [address] Ph #: [phone] Route: Inhalation Follow-up Instructions Return if symptoms worsen or fail to improve, for follow up. Introducing [facility]! Dear Paralee Finders: Thank you for requesting a Active [employer] account. Our records indicate that you already have an active [employer] account. You can access your account anytime at [email or web address] [employer]/Origami Logic Did you know that you can access your hospital and ER discharge instructions at any time in [employer]? You can also review all of your test results from your hospital stay or ER visit. Additional Information If you have questions, please visit the Frequently Asked Questions section of the [employer] website at [email or web address] Remember, [employer] is NOT to be used for urgent needs. For medical emergencies, dial 911. Now available from your iPhone and Android! Please provide this summary of care documentation to your next provider. Your primary care clinician is listed as Mayco Disla. If you have any questions after today's visit, please call [phone].

## 2018-03-21 NOTE — PROGRESS NOTES
Subjective:      Tawanda Gamez is a 52 y.o. male who presents today for   Chief Complaint   Patient presents with    Cough     Patient states he has flu symptoms such as congestion, some fevers, chills, body aches. Began on Thursday   has felt bad since Thursday  Flu symptoms for last few days, fever, chills, body aches  He has a cough mildly productive, persistent at times  Not sob  Had pneumonia last year and concerned about recurrence      Patient Active Problem List    Diagnosis Date Noted    Elevated BP without diagnosis of hypertension 11/20/2017    Chronic allergic rhinitis 11/20/2017    Alcohol consumption binge drinking 09/14/2016    Elevated hemoglobin A1c 04/01/2016    Hypercholesteremia 04/01/2016    Obesity (BMI 30-39.9) 03/07/2016    Advance care planning 03/07/2016    Rheumatoid arthritis ([facility] [address] 12/19/2012    Eczema 12/19/2012     Current Outpatient Prescriptions   Medication Sig Dispense Refill    ENBREL SURECLICK 50 mg/mL (3.76 mL) injection INJECT 50 MG SUBCUTANEOUSLY ONCE EVERY SEVEN DAYS 4 Each 10    Omega-3 Fatty Acids (FISH OIL) 500 mg cap Take  by mouth.  multivitamin (ONE A DAY) tablet Take 1 Tab by mouth daily.  melatonin tab tablet Take  by mouth nightly.        No Known Allergies  Past Medical History:   Diagnosis Date    Eczema 12/19/2012    RA [facility]rheumatoid [facility]) ([facility])      Past Surgical History:   Procedure Laterality Date    HX HERNIA REPAIR      R inguinal at 3 yo    HX KNEE ARTHROSCOPY      L Meniscus Tear, L ACL replacement    HX OTHER SURGICAL      Exercise stress echo WNL pp 2012     Family History   Problem Relation Age of Onset    Other Mother      hypoglycemia/borderline DM    Stroke Father 76    Arthritis-osteo Maternal Grandfather      ? rheumatoid    Diabetes Paternal Grandfather 61     Social History   Substance Use Topics    Smoking status: Former Smoker     Packs/day: 0.20     Quit date: 1/15/2017    Smokeless tobacco: Never Used Comment: 1 pack/week    Alcohol use 0.0 oz/week     0 Standard drinks or equivalent per week      Comment: 2-3x/week 10-12 drinks        Review of Systems    A comprehensive review of systems was negative except for that written in the HPI. Objective:     Visit Vitals    /79 (BP 1 Location: Left arm, BP Patient Position: Sitting)    Pulse 81    Temp 98.8 °F (37.1 °C) (Oral)    Resp 18    Ht 5' 8\" (1.727 m)    Wt 215 lb (97.5 kg)    SpO2 97%    BMI 32.69 kg/m2     General:  Alert, cooperative, mildly ill appearing, appears stated age. Head:  Normocephalic, without obvious abnormality, atraumatic. Eyes:  Conjunctivae/corneas clear. PERRL, EOMs intact. Fundi benign. Ears:  Normal TMs and external ear canals both ears. Nose: Nares normal. Septum midline. Mucosa erythematous. No drainage or sinus tenderness. Throat: Lips, mucosa, and tongue normal. Teeth and gums normal.   Neck: Supple, symmetrical, trachea midline, no adenopathy, thyroid: no enlargement/tenderness/nodules, no carotid bruit and no JVD. Back:   Symmetric, no curvature. ROM normal. No CVA tenderness. Lungs:   Sporadic scattered  faint wheeze   Chest wall:  No tenderness or deformity. Heart:  Regular rate and rhythm, S1, S2 normal, no murmur, click, rub or gallop. Abdomen:   Soft, non-tender. Bowel sounds normal. No masses,  No organomegaly. Extremities: Extremities normal, atraumatic, no cyanosis or edema. Pulses: 2+ and symmetric all extremities. Skin: Skin color, texture, turgor normal. No rashes or lesions. Lymph nodes: Cervical, supraclavicular, and axillary nodes normal.   Neurologic: CNII-XII intact. Normal strength, sensation and reflexes throughout. Assessment/Plan:       ICD-10-CM ICD-9-CM    1. Influenza B J10.1 487.1 tamiflu 75 bid x 5 days   2.  Cough R05 786.2 AMB POC VASU INFLUENZA A/B TEST  Augmentin course  proair inhaler       Follow-up Disposition: Not on File   Advised him to call back or return to office if symptoms worsen/change/persist.  Discussed expected course/resolution/complications of diagnosis in detail with patient. Medication risks/benefits/costs/interactions/alternatives discussed with patient. He was given an after visit summary which includes diagnoses, current medications, & vitals. He expressed understanding with the diagnosis and plan.

## 2018-03-21 NOTE — PROGRESS NOTES
Room 1    Chief Complaint   Patient presents with    Cough     Patient states he has flu symptoms such as congestion, some fevers, chills, body aches. Began on Thursday     1. Have you been to the ER, urgent care clinic since your last visit? Hospitalized since your last visit? No    2. Have you seen or consulted any other health care providers outside of the [address] [place] [address] since your last visit? Include any pap smears or colon screening.  No

## 2019-05-20 ENCOUNTER — OFFICE VISIT (OUTPATIENT)
Dept: INTERNAL MEDICINE CLINIC | Age: 49
End: 2019-05-20

## 2019-05-20 VITALS
BODY MASS INDEX: 33.4 KG/M2 | DIASTOLIC BLOOD PRESSURE: 84 MMHG | RESPIRATION RATE: 18 BRPM | SYSTOLIC BLOOD PRESSURE: 120 MMHG | HEART RATE: 74 BPM | HEIGHT: 68 IN | WEIGHT: 220.4 LBS | OXYGEN SATURATION: 96 % | TEMPERATURE: 98.5 F

## 2019-05-20 DIAGNOSIS — E66.9 OBESITY (BMI 30-39.9): ICD-10-CM

## 2019-05-20 DIAGNOSIS — R73.09 ELEVATED HEMOGLOBIN A1C: ICD-10-CM

## 2019-05-20 DIAGNOSIS — E78.00 HYPERCHOLESTEREMIA: ICD-10-CM

## 2019-05-20 DIAGNOSIS — F10.10 ALCOHOL CONSUMPTION BINGE DRINKING: ICD-10-CM

## 2019-05-20 DIAGNOSIS — Z00.00 ANNUAL PHYSICAL EXAM: Primary | ICD-10-CM

## 2019-05-20 DIAGNOSIS — M06.9 RHEUMATOID ARTHRITIS, INVOLVING UNSPECIFIED SITE, UNSPECIFIED RHEUMATOID FACTOR PRESENCE: ICD-10-CM

## 2019-05-20 NOTE — PROGRESS NOTES
1. Have you been to the ER, urgent care clinic since your last visit? Hospitalized since your last visit? No    2. Have you seen or consulted any other health care providers outside of the [address] [place] [address] since your last visit? Include any pap smears or colon screening.  No   Chief Complaint   Patient presents with    Complete Physical     Fasting    Patient is aware that additional charge will be bill for anything outside of his Complete Physical

## 2019-05-20 NOTE — PROGRESS NOTES
James Decker is a 50 y.o. male  Chief Complaint   Patient presents with    Complete Physical        There are no preventive care reminders to display for this patient. HPI  CE today. Hx Obesity:   Wt Readings from Last 3 Encounters:   05/20/19 220 lb 6.4 oz (100 kg)   11/26/18 215 lb (97.5 kg)   03/21/18 215 lb (97.5 kg)   Stopped smoking, but has been eating poorly recently. Plans to work on this. Hx Binge drinking Alcohol - Currently drinking less now: 1x/week 8-10 drinks. Snoring regularly s/p nose injury in 2017 - planning to see [facility] (ENT)     Hx hyperchol & elevated A1c:   Lab Results   Component Value Date/Time    Cholesterol, total 166 11/20/2017 09:34 AM    HDL Cholesterol 43 11/20/2017 09:34 AM    LDL, calculated 84 11/20/2017 09:34 AM    VLDL, calculated 39 11/20/2017 09:34 AM    Triglyceride 194 (H) 11/20/2017 09:34 AM     Lab Results   Component Value Date/Time    Hemoglobin A1c 6.1 (H) 11/20/2017 09:34 AM    Hemoglobin A1c (POC) 5.7 04/17/2017 08:48 AM       Seeing Rheumatology for RA. Pt reports that Dr. Chevy Stein is retiring, so pt is transitioning to Dr. Abdulaziz Ballard in July    Review of Systems   Constitutional: Negative for fever and weight loss. HENT: Positive for congestion. Negative for hearing loss and sore throat. Eyes: Negative for blurred vision. Respiratory: Negative for cough and shortness of breath. Cardiovascular: Negative for chest pain, palpitations and leg swelling. Gastrointestinal: Negative for abdominal pain, blood in stool, constipation, diarrhea, heartburn, melena, nausea and vomiting. Genitourinary: Negative for dysuria, frequency, hematuria and urgency. Musculoskeletal: Positive for joint pain. Negative for back pain and neck pain. Neurological: Negative for dizziness, seizures, loss of consciousness, weakness and headaches. Endo/Heme/Allergies: Negative for environmental allergies. Psychiatric/Behavioral: Negative for depression.  The patient is not nervous/anxious and does not have insomnia. Binge drinking once weekly. Physical Exam   Constitutional: He is oriented to person, place, and time. He appears well-developed and well-nourished. No distress. HENT:   Head: Normocephalic and atraumatic. Right Ear: External ear normal.   Left Ear: External ear normal.   Nose: Nose normal.   Mouth/Throat: Oropharynx is clear and moist.   Eyes: Conjunctivae are normal.   Neck: Neck supple. No JVD present. No thyromegaly present. Cardiovascular: Normal rate, regular rhythm and normal heart sounds. Pulmonary/Chest: Effort normal and breath sounds normal. No respiratory distress. Abdominal: Soft. Bowel sounds are normal. He exhibits no distension and no mass. There is no tenderness. There is no rebound and no guarding. Musculoskeletal: He exhibits no edema. Lymphadenopathy:     He has no cervical adenopathy. Neurological: He is alert and oriented to person, place, and time. Skin: Skin is warm and dry. Psychiatric: He has a normal mood and affect. His behavior is normal. Judgment and thought content normal.   Nursing note and vitals reviewed. Diagnoses and all orders for this visit:    1. Elevated hemoglobin A1c  -     HEMOGLOBIN A1C W/O EAG    2. Hypercholesteremia  -     LIPID PANEL    3. Rheumatoid arthritis, involving unspecified site, unspecified rheumatoid factor presence (HCC)  -     CBC WITH AUTOMATED DIFF  -     SED RATE (ESR)  -     C REACTIVE PROTEIN, QT    4. Obesity (BMI 30-39. 9)  Discussed diet/exercise and options for/importance of losing weight. 5. Alcohol consumption binge drinking  Discussed risk of binge drinking including liver disease. Pt notes understanding.      6. Annual physical exam  -     METABOLIC PANEL, COMPREHENSIVE

## 2019-05-21 LAB
ALBUMIN SERPL-MCNC: 4.2 G/DL (ref 3.5–5.5)
ALBUMIN/GLOB SERPL: 1.3 {RATIO} (ref 1.2–2.2)
ALP SERPL-CCNC: 92 IU/L (ref 39–117)
ALT SERPL-CCNC: 32 IU/L (ref 0–44)
AST SERPL-CCNC: 30 IU/L (ref 0–40)
BASOPHILS # BLD AUTO: 0 X10E3/UL (ref 0–0.2)
BASOPHILS NFR BLD AUTO: 1 %
BILIRUB SERPL-MCNC: 0.3 MG/DL (ref 0–1.2)
BUN SERPL-MCNC: 12 MG/DL (ref 6–24)
BUN/CREAT SERPL: 14 (ref 9–20)
CALCIUM SERPL-MCNC: 9 MG/DL (ref 8.7–10.2)
CHLORIDE SERPL-SCNC: 101 MMOL/L (ref 96–106)
CHOLEST SERPL-MCNC: 169 MG/DL (ref 100–199)
CO2 SERPL-SCNC: 22 MMOL/L (ref 20–29)
CREAT SERPL-MCNC: 0.87 MG/DL (ref 0.76–1.27)
CRP SERPL-MCNC: 2.1 MG/L (ref 0–4.9)
EOSINOPHIL # BLD AUTO: 0.2 X10E3/UL (ref 0–0.4)
EOSINOPHIL NFR BLD AUTO: 3 %
ERYTHROCYTE [DISTWIDTH] IN BLOOD BY AUTOMATED COUNT: 14 % (ref 12.3–15.4)
ERYTHROCYTE [SEDIMENTATION RATE] IN BLOOD BY WESTERGREN METHOD: 29 MM/HR (ref 0–15)
GLOBULIN SER CALC-MCNC: 3.2 G/DL (ref 1.5–4.5)
GLUCOSE SERPL-MCNC: 114 MG/DL (ref 65–99)
HBA1C MFR BLD: 6.3 % (ref 4.8–5.6)
HCT VFR BLD AUTO: 45.1 % (ref 37.5–51)
HDLC SERPL-MCNC: 44 MG/DL
HGB BLD-MCNC: 15.2 G/DL (ref 13–17.7)
IMM GRANULOCYTES # BLD AUTO: 0 X10E3/UL (ref 0–0.1)
IMM GRANULOCYTES NFR BLD AUTO: 0 %
INTERPRETATION, 910389: NORMAL
LDLC SERPL CALC-MCNC: 83 MG/DL (ref 0–99)
LYMPHOCYTES # BLD AUTO: 1.3 X10E3/UL (ref 0.7–3.1)
LYMPHOCYTES NFR BLD AUTO: 21 %
MCH RBC QN AUTO: 31.4 PG (ref 26.6–33)
MCHC RBC AUTO-ENTMCNC: 33.7 G/DL (ref 31.5–35.7)
MCV RBC AUTO: 93 FL (ref 79–97)
MONOCYTES # BLD AUTO: 0.5 X10E3/UL (ref 0.1–0.9)
MONOCYTES NFR BLD AUTO: 8 %
NEUTROPHILS # BLD AUTO: 4 X10E3/UL (ref 1.4–7)
NEUTROPHILS NFR BLD AUTO: 67 %
PLATELET # BLD AUTO: 260 X10E3/UL (ref 150–450)
POTASSIUM SERPL-SCNC: 4.4 MMOL/L (ref 3.5–5.2)
PROT SERPL-MCNC: 7.4 G/DL (ref 6–8.5)
RBC # BLD AUTO: 4.84 X10E6/UL (ref 4.14–5.8)
SODIUM SERPL-SCNC: 138 MMOL/L (ref 134–144)
TRIGL SERPL-MCNC: 208 MG/DL (ref 0–149)
VLDLC SERPL CALC-MCNC: 42 MG/DL (ref 5–40)
WBC # BLD AUTO: 6 X10E3/UL (ref 3.4–10.8)

## 2019-05-21 NOTE — PROGRESS NOTES
Your glucose/A1c (sugar) is in the \"prediabetic\" range and is very close to being in the Diabetes range. Your triglycerides are high. Decrease carbohydrates (bread, potatoes, alcohol, rice, cereal, sugar, sweets), and increase cardio exercise. Try to avoid liquid calories (non-diet soda, gatorade, sweet tea, juice). Blood Count, liver enzymes, kidney function, and electrolytes are all normal. Good! LDL cholesterol is controlled. Please review your Sed Rate and CRP with rheumatology. Nice seeing you yesterday!

## 2019-07-17 ENCOUNTER — OFFICE VISIT (OUTPATIENT)
Dept: RHEUMATOLOGY | Age: 49
End: 2019-07-17

## 2019-07-17 VITALS
HEIGHT: 68 IN | BODY MASS INDEX: 33.8 KG/M2 | RESPIRATION RATE: 20 BRPM | OXYGEN SATURATION: 94 % | DIASTOLIC BLOOD PRESSURE: 83 MMHG | WEIGHT: 223 LBS | TEMPERATURE: 98.7 F | HEART RATE: 76 BPM | SYSTOLIC BLOOD PRESSURE: 135 MMHG

## 2019-07-17 DIAGNOSIS — Z79.60 LONG-TERM USE OF IMMUNOSUPPRESSANT MEDICATION: ICD-10-CM

## 2019-07-17 DIAGNOSIS — M05.9 SEROPOSITIVE RHEUMATOID ARTHRITIS (HCC): Primary | ICD-10-CM

## 2019-07-17 DIAGNOSIS — L30.9 ECZEMA, UNSPECIFIED TYPE: ICD-10-CM

## 2019-07-17 NOTE — PROGRESS NOTES
REASON FOR VISIT    This is the initial evaluation for Mr. Richard Burrell a 50 y.o.  male for question of rheumatoid arthritis. The patient is referred to the [facility] at the request of Cleveland Hamman. HISTORY OF PRESENT ILLNESS     Previous medical records reviewed and summarized: yes    MHAQ: 0  Pain scale: 5/10    This is a 50 y.o. male with hx of HLD, obesity, RA. Patient was diagnosed with RA around the age of 36 around 2011. At that time, he had fatigue. He had swelling in MCPs, elbows, MTPs, knees, shoulders. He had sausage fingers as well. He saw a rheumatologist and blood work showed rheumatoid arthritis. AT that time, he was put on prednisone and Arava. These helped. He took this for 2 years. He then came off of both and went on Enbrel because he didn't want to be on prednisone. Enbrel works very well for him and he is still on it. He has no symptoms at all on him. 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 Otherwise, no joint pain or joint swelling. Very mild morning stiffness for a few seconds. He gets eczema and he gets it in his hands mainly. No lower back pain with any of this. NO eye issues  No diarrhea, blood in stool or urine    REVIEW OF SYSTEMS    A 15 point review of systems was performed and summarized below. The questionnaire was reviewed with the patient and scanned into the patient's medical record.     General: denies recent weight gain, recent weight loss, fatigue, weakness, fever, night sweats  Musculoskeletal:denies joint pain, joint swelling, morning stiffness, muscle pain  Ears:  denies ringing in ears, loss of hearing, deafness  Eyes:  denies pain, redness, loss of vision, double vision, blurred vision, dryness, foreign body sensation  Mouth:  denies sore tongue, oral ulcers, bleeding gums, loss of taste, dryness, increased dental caries  Nose: denies nosebleeds, loss of smell, nasal ulcers  Throat:  denies frequent sore throats, hoarseness, difficulty in swallowing, pain in jaw while chewing  Neck:  denies swollen glands, tender glands  Cardiopulmonary:  denies pain in chest, irregular heart beat, sudden changes in heart beat, shortness of breath, difficulty breathing at night, dry cough, productive cough, coughing of blood, wheezing  Gastrointestinal:  denies nausea, heartburn, stomach pain relieved by food, vomiting of blood/\"coffee grounds\", jaundice, increasing constipation, persistent diarrhea, blood in stools, black stools  Genitourinary:  denies nocturia, difficult urination, pain or burning on urination, blood in urine, cloudy urine, pus in urine, genital discharge, frequent urination, vaginal dryness, rash/ulcers, sexual difficulties  Hematologic:  denies anemia, bleeding tendency, blood clots  Skin:  denies easy bruising, sun sensitive, rash, redness, hives, skin tightness, nodules/bumps, hair loss, color changes of hands or feet in the cold (Raynaud's), nailbed changes, mechanics hands  Neurologic: denies headaches, dizziness, muscle weakness, numbness or tingling in hands/feet, memory loss  Psychiatric: denies depression, excessive worries, PTSD, Bipolar  Sleep: endorses poor sleep (6 hours),denies  denies snoring, apnea, daytime somnolence, difficulty falling asleep, difficulty staying asleep     PAST MEDICAL HISTORY    Past Medical History:   Diagnosis Date    Eczema 12/19/2012    RA (rheumatoid arthritis) ([facility] [address]         Past Surgical History:   Procedure Laterality Date    HX HERNIA REPAIR      R inguinal at 3 yo    HX KNEE ARTHROSCOPY      L Meniscus Tear, L ACL replacement    HX OTHER SURGICAL      Exercise stress echo WNL pp 2012       FAMILY HISTORY    Family History   Problem Relation Age of Onset    Other Mother         hypoglycemia/borderline DM    Stroke Father 76    Arthritis-osteo Maternal Grandfather         ? rheumatoid    Diabetes Paternal Grandfather 61       SOCIAL HISTORY    Social History     Tobacco Use    Smoking status: Former Smoker     Packs/day: 0.20     Last attempt to quit: 1/15/2017     Years since quittin.5    Smokeless tobacco: Never Used    Tobacco comment: 1 pack/week   Substance Use Topics    Alcohol use: Yes     Alcohol/week: 0.0 standard drinks     Comment: 1x.week 8-10 drinks    Drug use: No       MEDICATIONS    Current Outpatient Medications   Medication Sig Dispense Refill    etanercept (ENBREL MINI) 50 mg/mL (0.98 mL) crtg 50 mg by SubCUTAneous route every seven (7) days. 12 Cartridge 4    Omega-3 Fatty Acids (FISH OIL) 500 mg cap Take  by mouth.  multivitamin (ONE A DAY) tablet Take 1 Tab by mouth daily.  melatonin tab tablet Take  by mouth nightly. ALLERGIES    No Known Allergies    PHYSICAL EXAMINATION    Visit Vitals  /83 (BP 1 Location: Left arm, BP Patient Position: Sitting)   Pulse 76   Temp 98.7 °F (37.1 °C) (Oral)   Resp 20   Ht 5' 8\" (1.727 m)   Wt 223 lb (101.2 kg)   SpO2 94%   BMI 33.91 kg/m²     Body mass index is 33.91 kg/m². General: NAD  HEENT: PERRL, anicteric, non-injected sclerae; oropharynx without ulcers, erythema, or exudate. Moist mucous membranes. Lymphatic: No cervical or axillary lymphadenopathy. Cardiovascular: S1, S2,no R/M/G  Pulmonary: CTA b/l. No wheezes/rales/rhonchi. Abdominal: Soft,NTND, + BS. Skin: No rash, nodules, or periungual changes. Neuro: Alert; able to carry normal conversation    Musculoskeletal:   Cervical & Lumbar Spine  Neck and spine have no noted deformities or signs of inflammation. Curvature of cervical, thoracic, and lumbar spine are within normal limits. Wrist, Hand, & Fingers  No bony deformities, inflammation, or tenderness of bony prominences. No anatomical snuff box tenderness; Full ROM in DIP, PIP, MCP, & carpal joints & with supination and pronation. Elbow  No bony deformities, inflammation, or tenderness in olecranon, medial, lateral epicondyle elbow.  Full ROM upon flexion and extension. Shoulder  No bony deformities, inflammation, or tenderness in rotator cuff, biceps tendon, or acromioclavicular joint. Full ROM and strength in shoulder upon adduction, abduction, internal and external rotation. Hip  No bony deformities, inflammation, or tenderness in hip joint. Knee  FROM of the knee. NO swelling or warmth noted. No bony deformity noted    Ankle/toes  No bony deformities, inflammation or tenderness    DATA REVIEW    Prior medical records were reviewed and if applicable are summarized as below:    Labs:   5/2019: CRP normal, ESR 29, Cbc, cmp unremarkable    Imaging:   N/A    ASSESSMENT AND PLAN    A 50 y.o. male with hx of rheumatoid arthritis on Enbrel presents today to establish care for rheumatoid arthritis. The patient is in remission and doing well on current medication regimen. # Seropositive rheumatoid arthritis:  - RF, CPP  - b/l hand and foot x-rays  - continue Enbrel weekly    # Eczema:  - topicals    # Medication Toxicity Monitoring:  - cbc, cmp   - Hepatitis B, C: Ordered today  - Quant gold: Ordered today  - Immunizations: We discussed receiving influenza, Prevar-13, and Pneumovax-23 vaccines as per the CDC guidelines for immunosuppressed patients. - bone health: Discuss at next visit    The patient voiced understanding of the aforementioned assessment and plan. Summary of plan was provided in the After Visit Summary patient instructions. I also provided education about MyChart setup and utility. Mr. Venice Ruiz has a reminder for a \"due or due soon\" health maintenance. I have asked that he contact his primary care provider for follow-up on this health maintenance. TODAY'S ORDERS    No orders of the defined types were placed in this encounter. No future appointments.     Steven Harding MD    Adult Rheumatology   [facility]  A Part of [facility],  [employer] [place]   Phone [phone]  Fax [phone]

## 2019-07-20 LAB
CCP IGA+IGG SERPL IA-ACNC: >250 UNITS (ref 0–19)
COMMENT, 144067: NORMAL
GAMMA INTERFERON BACKGROUND BLD IA-ACNC: 0.01 IU/ML
HBV CORE AB SERPL QL IA: NEGATIVE
HBV CORE IGM SERPL QL IA: NEGATIVE
HBV E AB SERPL QL IA: NEGATIVE
HBV E AG SERPL QL IA: NEGATIVE
HBV SURFACE AB SER QL: NON REACTIVE
HBV SURFACE AG SERPL QL IA: NEGATIVE
HCV AB S/CO SERPL IA: <0.1 S/CO RATIO (ref 0–0.9)
M TB IFN-G BLD-IMP: NEGATIVE
M TB IFN-G CD4+ BCKGRND COR BLD-ACNC: 0.04 IU/ML
MITOGEN IGNF BLD-ACNC: >10 IU/ML
QUANTIFERON INCUBATION, QF1T: NORMAL
QUANTIFERON TB2 AG: 0.03 IU/ML
RHEUMATOID FACT SERPL-ACNC: 297.7 IU/ML (ref 0–13.9)
SERVICE CMNT-IMP: NORMAL

## 2019-10-18 ENCOUNTER — OFFICE VISIT (OUTPATIENT)
Dept: RHEUMATOLOGY | Age: 49
End: 2019-10-18

## 2019-10-18 VITALS
SYSTOLIC BLOOD PRESSURE: 118 MMHG | TEMPERATURE: 99 F | HEART RATE: 86 BPM | RESPIRATION RATE: 18 BRPM | WEIGHT: 223 LBS | DIASTOLIC BLOOD PRESSURE: 75 MMHG | BODY MASS INDEX: 33.8 KG/M2 | HEIGHT: 68 IN | OXYGEN SATURATION: 93 %

## 2019-10-18 DIAGNOSIS — M05.9 SEROPOSITIVE RHEUMATOID ARTHRITIS (HCC): Primary | ICD-10-CM

## 2019-10-18 DIAGNOSIS — Z79.60 LONG-TERM USE OF IMMUNOSUPPRESSANT MEDICATION: ICD-10-CM

## 2019-10-18 DIAGNOSIS — L30.9 ECZEMA, UNSPECIFIED TYPE: ICD-10-CM

## 2019-10-18 NOTE — PATIENT INSTRUCTIONS
Please fill out your [address] you will receive after your visit in the mail or via [address]!

## 2019-10-18 NOTE — PROGRESS NOTES
Chief Complaint   Patient presents with    Arthritis     1. Have you been to the ER, urgent care clinic since your last visit? Hospitalized since your last visit? No    2. Have you seen or consulted any other health care providers outside of the [address] [place] [address] since your last visit? Include any pap smears or colon screening.  Yes Where: [facility] ENT

## 2019-10-18 NOTE — PROGRESS NOTES
REASON FOR VISIT    Patient presents for a follow up visit for    ICD-10-CM ICD-9-CM    1. Seropositive rheumatoid arthritis ([facility] M05.9 714.0    2. Long-term use of immunosuppressant medication Z79.899 V58.69    3. Eczema, unspecified type L30.9 692.9        HISTORY OF DISEASE: Seropositive Rheumatoid Arthritis    Year of diagnosis: 2011  First visit with me: 7/2019  Cumulative disease manifestations: small joint arthritis  Positive serologies/labs: RF, CCP  Negative serologies/labs: Other co-morbidities: HLD     Past treatment history:  Prednisone:   Non-biologic DMARD: Arava for 2 years in the past  Biologic: Enbrel weekly (2013-present)  Other:       HISTORY OF PRESENT ILLNESS     Previous medical records reviewed and summarized: yes    MHAQ: 0  Pain scale: 0/10    This is a 50 y.o. male with hx of HLD, obesity, RA. Patient notes he is doing well. NO joint pain, swelling or stiffness. He is still on weekly Enbrel.         REVIEW OF SYSTEMS    Positives as per history  Negatives as follows:  Daviddaniela Cresencio:    Denies unexplained persistent fevers, weight change, chronic fatigue  HEAD/EYES:              Denies eye redness, blurry vision or sudden loss of vision, dry eyes, HA, temporal artery pain  ENT:                            Denies oral/nasal ulcers, recurrent sinus infections, dry mouth  RESPIRATORY:         No pleuritic pain, history of pleural effusions, hemoptysis, exertional dyspnea  CARDIOVASCULAR:             Denies chest pain, history of pericardial effusions  GASTRO:                    Denies heartburn, esophageal dysmotility, abdominal pain, nausea, vomiting, diarrhea, blood in the stool  HEMATOLOGIC:        No easy bruising, purpura, swollen lymph nodes  SKIN:                           Denies alopecia, ulcers, nodules, sun sensitivity, unexplained persistent rash   VASCULAR:                Denies edema, cyanosis, raynaud phenomenon  NEUROLOGIC:           Denies specific muscle weakness, paresthesias   PSYCHIATRIC:           No sleep disturbance / snoring, depression, anxiety  MSK:                           No morning stiffness >1 hour, SI joint pain, persistent joint swelling, persistent joint pain      PAST MEDICAL HISTORY    Past Medical History:   Diagnosis Date    Eczema 2012    RA (rheumatoid arthritis) ([facility]         Past Surgical History:   Procedure Laterality Date    HX HERNIA REPAIR      R inguinal at 3 yo    HX KNEE ARTHROSCOPY      L Meniscus Tear, L ACL replacement    HX OTHER SURGICAL      Exercise stress echo WNL pp        FAMILY HISTORY    Family History   Problem Relation Age of Onset    Other Mother         hypoglycemia/borderline DM    Stroke Father 76    Arthritis-osteo Maternal Grandfather         ? rheumatoid    Diabetes Paternal Grandfather 61       SOCIAL HISTORY    Social History     Tobacco Use    Smoking status: Former Smoker     Packs/day: 0.20     Last attempt to quit: 1/15/2017     Years since quittin.7    Smokeless tobacco: Never Used    Tobacco comment: 1 pack/week   Substance Use Topics    Alcohol use: Yes     Alcohol/week: 0.0 standard drinks     Comment: 1x.week 8-10 drinks    Drug use: No       MEDICATIONS    Current Outpatient Medications   Medication Sig Dispense Refill    etanercept (ENBREL MINI) 50 mg/mL (0.98 mL) crtg 50 mg by SubCUTAneous route every seven (7) days. 12 Cartridge 4    Omega-3 Fatty Acids (FISH OIL) 500 mg cap Take  by mouth.  multivitamin (ONE A DAY) tablet Take 1 Tab by mouth daily.  melatonin tab tablet Take  by mouth nightly. ALLERGIES    No Known Allergies    PHYSICAL EXAMINATION    Visit Vitals  /75 (BP 1 Location: Right arm, BP Patient Position: Sitting)   Pulse 86   Temp 99 °F (37.2 °C) (Oral)   Resp 18   Ht 5' 8\" (1.727 m)   Wt 223 lb (101.2 kg)   SpO2 93%   BMI 33.91 kg/m²     Body mass index is 33.91 kg/m².     General: NAD  HEENT: PERRL, anicteric, non-injected sclerae; oropharynx without ulcers, erythema, or exudate. Moist mucous membranes. Lymphatic: No cervical or axillary lymphadenopathy. Cardiovascular: S1, S2,no R/M/G  Pulmonary: CTA b/l. No wheezes/rales/rhonchi. Abdominal: Soft,NTND, + BS. Skin: No rash, nodules, or periungual changes. Neuro: Alert; able to carry normal conversation    Musculoskeletal:   Cervical & Lumbar Spine  Neck and spine have no noted deformities or signs of inflammation. Curvature of cervical, thoracic, and lumbar spine are within normal limits. Wrist, Hand, & Fingers  No bony deformities, inflammation, or tenderness of bony prominences. No anatomical snuff box tenderness; Full ROM in DIP, PIP, MCP, & carpal joints & with supination and pronation. Elbow  No bony deformities, inflammation, or tenderness in olecranon, medial, lateral epicondyle elbow. Full ROM upon flexion and extension. Shoulder  No bony deformities, inflammation, or tenderness in rotator cuff, biceps tendon, or acromioclavicular joint. Full ROM and strength in shoulder upon adduction, abduction, internal and external rotation. Hip  No bony deformities, inflammation, or tenderness in hip joint. Knee  FROM of the knee. NO swelling or warmth noted. No bony deformity noted    Ankle/toes  No bony deformities, inflammation or tenderness    DATA REVIEW    Prior medical records were reviewed and if applicable are summarized as below:    Labs:   7/2019: Quant gold, HBV, HCV negative, + RF, CCP  5/2019: CRP normal, ESR 29, Cbc, cmp unremarkable    Imaging:   N/A    ASSESSMENT AND PLAN    A 50 y.o. male with hx of rheumatoid arthritis on Enbrel presents for a follow up visit. The patient is in remission and doing well on current medication regimen.      # Seropositive rheumatoid arthritis:  - b/l hand and foot x-rays  - continue Enbrel weekly    # Eczema:  - topicals    # Medication Toxicity Monitoring:  - cbc, cmp   - Hepatitis B, C: negative 7/2019  - Quant gold: negative 7/2019  - Immunizations: We discussed receiving influenza, Prevar-13, and Pneumovax-23 vaccines as per the CDC guidelines for immunosuppressed patients. The patient voiced understanding of the aforementioned assessment and plan. Summary of plan was provided in the After Visit Summary patient instructions. I also provided education about MyChart setup and utility. Mr. Cheyanne Salter has a reminder for a \"due or due soon\" health maintenance. I have asked that he contact his primary care provider for follow-up on this health maintenance. TODAY'S ORDERS    No orders of the defined types were placed in this encounter.       Future Appointments   Date Time Provider Darren Petit   2/21/2020  2:00 PM La Ragsdale MD [facility] Jocelyn [address], MD    Adult Rheumatology   [facility]  A Part of [address]   Phone [phone]  Fax [phone]

## 2020-01-17 ENCOUNTER — OFFICE VISIT (OUTPATIENT)
Dept: INTERNAL MEDICINE CLINIC | Age: 50
End: 2020-01-17

## 2020-01-17 VITALS
HEART RATE: 74 BPM | WEIGHT: 222.2 LBS | RESPIRATION RATE: 16 BRPM | SYSTOLIC BLOOD PRESSURE: 120 MMHG | BODY MASS INDEX: 33.68 KG/M2 | TEMPERATURE: 98.4 F | OXYGEN SATURATION: 97 % | HEIGHT: 68 IN | DIASTOLIC BLOOD PRESSURE: 82 MMHG

## 2020-01-17 DIAGNOSIS — J45.21 MILD INTERMITTENT ASTHMA WITH ACUTE EXACERBATION: ICD-10-CM

## 2020-01-17 DIAGNOSIS — J01.90 ACUTE NON-RECURRENT SINUSITIS, UNSPECIFIED LOCATION: Primary | ICD-10-CM

## 2020-01-17 PROBLEM — J45.20 MILD INTERMITTENT ASTHMA WITHOUT COMPLICATION: Status: ACTIVE | Noted: 2020-01-17

## 2020-01-17 RX ORDER — AMOXICILLIN 875 MG/1
875 TABLET, FILM COATED ORAL 2 TIMES DAILY
Qty: 20 TAB | Refills: 0 | Status: SHIPPED | OUTPATIENT
Start: 2020-01-17 | End: 2021-07-01 | Stop reason: ALTCHOICE

## 2020-01-17 RX ORDER — METHYLPREDNISOLONE 4 MG/1
TABLET ORAL
Qty: 1 DOSE PACK | Refills: 0 | Status: SHIPPED | OUTPATIENT
Start: 2020-01-17 | End: 2021-07-01 | Stop reason: ALTCHOICE

## 2020-01-17 RX ORDER — ALBUTEROL SULFATE 90 UG/1
2 AEROSOL, METERED RESPIRATORY (INHALATION)
Qty: 1 INHALER | Refills: 1 | Status: SHIPPED | OUTPATIENT
Start: 2020-01-17 | End: 2020-10-21 | Stop reason: SDUPTHER

## 2020-01-17 NOTE — PROGRESS NOTES
1. Have you been to the ER, urgent care clinic since your last visit? Hospitalized since your last visit? No    2. Have you seen or consulted any other health care providers outside of the [address] [place] [address] since your last visit? Include any pap smears or colon screening.  No   Chief Complaint   Patient presents with    Cold Symptoms     started on 1/11/2020       Not fasting

## 2020-01-17 NOTE — PROGRESS NOTES
Jeronimo Collins. is a 52 y.o. male  Chief Complaint   Patient presents with    Cold Symptoms     started on 1/11/2020       Visit Vitals  /82 (BP 1 Location: Left arm, BP Patient Position: At rest)   Pulse 74   Temp 98.4 °F (36.9 °C) (Oral)   Resp 16   Ht 5' 8\" (1.727 m)   Wt 222 lb 3.2 oz (100.8 kg)   SpO2 97%   BMI 33.79 kg/m²      Health Maintenance Due   Topic Date Due    Influenza Age 5 to Adult  08/01/2019       HPI  Today is day #7 of illness. Taking Robitussin DM regularly. Not improving. Using albuterol inhaler every 2 hours. Review of Systems   Constitutional: Positive for diaphoresis and malaise/fatigue. Negative for fever. HENT: Positive for congestion. Negative for ear pain. Respiratory: Positive for cough and sputum production. Negative for shortness of breath. Neurological: Positive for headaches. Endo/Heme/Allergies: Negative for environmental allergies. Physical Exam  Vitals signs and nursing note reviewed. Constitutional:       General: He is not in acute distress. Appearance: He is well-developed. HENT:      Head: Normocephalic and atraumatic. Comments: B TMs with fluid. No erythema. Nose: Congestion and rhinorrhea present. Comments: Red, inflamed nasal mucosa. Mouth/Throat:      Pharynx: No oropharyngeal exudate. Eyes:      Conjunctiva/sclera: Conjunctivae normal.   Neck:      Musculoskeletal: Neck supple. Cardiovascular:      Rate and Rhythm: Normal rate and regular rhythm. Heart sounds: Normal heart sounds. Pulmonary:      Effort: Pulmonary effort is normal.      Breath sounds: Wheezing and rhonchi present. Lymphadenopathy:      Cervical: No cervical adenopathy. Skin:     General: Skin is warm and dry. Neurological:      Mental Status: He is alert and oriented to person, place, and time. Diagnoses and all orders for this visit:    1.  Acute non-recurrent sinusitis, unspecified location  -     amoxicillin (AMOXIL) 875 mg tablet; Take 1 Tab by mouth two (2) times a day. 2. Mild intermittent asthma with acute exacerbation  -     methylPREDNISolone (MEDROL, PAU,) 4 mg tablet; Take as directed on dose pack. -     albuterol (PROVENTIL HFA, VENTOLIN HFA, PROAIR HFA) 90 mcg/actuation inhaler; Take 2 Puffs by inhalation every four (4) hours as needed for Shortness of Breath.

## 2020-10-21 DIAGNOSIS — J45.21 MILD INTERMITTENT ASTHMA WITH ACUTE EXACERBATION: ICD-10-CM

## 2020-10-23 RX ORDER — ALBUTEROL SULFATE 90 UG/1
2 AEROSOL, METERED RESPIRATORY (INHALATION)
Qty: 1 INHALER | Refills: 1 | Status: SHIPPED | OUTPATIENT
Start: 2020-10-23

## 2020-11-13 ENCOUNTER — TRANSCRIBE ORDER (OUTPATIENT)
Dept: RHEUMATOLOGY | Age: 50
End: 2020-11-13

## 2020-11-16 RX ORDER — ETANERCEPT 50 MG/ML
SOLUTION SUBCUTANEOUS
Qty: 12 CARTRIDGE | Refills: 0 | Status: SHIPPED | OUTPATIENT
Start: 2020-11-16 | End: 2020-11-27 | Stop reason: SDUPTHER

## 2020-11-16 NOTE — TELEPHONE ENCOUNTER
----- Message from Umm Go RN sent at 11/13/2020  1:56 PM EST -----  Regarding: FW: Dr. Bart Carter's pt  ----- Message -----  From: Mariela Quach  Sent: 11/13/2020   1:25 PM EST  To: [facility] Nurse Pool  Subject: FW: Dr. Bart Britt                            ----- Message -----  From: Maribel Castaneda  Sent: 11/13/2020  12:36 PM EST  To: [facility] Front Office Pool  Subject: Dr. Vibha Robles (if not patient):      Relationship of caller (if not patient):      Best contact number(s): [phone]      Name of medication and dosage if known: ENBREL MINI 50 mg/mL (1 mL) crtg       Is patient out of this medication (yes/no):  No      Pharmacy name:  [facility] listed in chart? (yes/no): Yes     Pharmacy phone number: [phone]      Details to clarify the request: Previous pt of Dr. Ashly Wolfe

## 2020-11-27 ENCOUNTER — OFFICE VISIT (OUTPATIENT)
Dept: RHEUMATOLOGY | Age: 50
End: 2020-11-27
Payer: COMMERCIAL

## 2020-11-27 VITALS
TEMPERATURE: 97.1 F | RESPIRATION RATE: 18 BRPM | DIASTOLIC BLOOD PRESSURE: 87 MMHG | BODY MASS INDEX: 35.88 KG/M2 | SYSTOLIC BLOOD PRESSURE: 147 MMHG | WEIGHT: 236 LBS | HEART RATE: 76 BPM

## 2020-11-27 DIAGNOSIS — R21 RASH: ICD-10-CM

## 2020-11-27 DIAGNOSIS — M05.9 SEROPOSITIVE RHEUMATOID ARTHRITIS (HCC): Primary | ICD-10-CM

## 2020-11-27 DIAGNOSIS — E66.01 SEVERE OBESITY (HCC): ICD-10-CM

## 2020-11-27 DIAGNOSIS — Z79.899 ONGOING USE OF POSSIBLY TOXIC MEDICATION: ICD-10-CM

## 2020-11-27 PROCEDURE — 99215 OFFICE O/P EST HI 40 MIN: CPT | Performed by: INTERNAL MEDICINE

## 2020-11-27 RX ORDER — ETANERCEPT 50 MG/ML
SOLUTION SUBCUTANEOUS
Qty: 12 CARTRIDGE | Refills: 3 | Status: SHIPPED | OUTPATIENT
Start: 2020-11-27 | End: 2021-02-08

## 2020-11-27 NOTE — PATIENT INSTRUCTIONS
1. Continue weekly Enbrel shots. Enbrel reordered for prior auth. 2. Labs ASAP, and every 6 months (ie again before next visit 3. Return in 6 months, call with problems.

## 2020-11-27 NOTE — PROGRESS NOTES
REASON FOR VISIT    Patient presents for a follow up visit for seropositive rheumatoid arthritis. HISTORY OF DISEASE: Seropositive Rheumatoid Arthritis    Year of diagnosis: 2011  First visit with me: 7/2019  Cumulative disease manifestations: small joint arthritis  Positive serologies/labs: RF, CCP  Negative serologies/labs: Other co-morbidities: HLD     Past treatment history:  Prednisone:   Non-biologic DMARD: Arava for 2 years in the past, Plaquenil in more remote past.  Biologic: Enbrel weekly (2013-present)  Other:       HISTORY OF PRESENT ILLNESS     Previous medical records reviewed and summarized: yes    Joints are well, hasn't flared in the last year that he can recall. No symptoms before his next injection. Gets some stiffness in the MCPs with prolonged computer work. Has improved diet, cut back on meat, cut back on heavy alcohol.  for [employer].  High stress time, coordinated vaccine adjuvant       REVIEW OF SYSTEMS    Positives as per history  Negatives as follows:  CONSTITUTlONAL:    Denies unexplained persistent fevers, weight change, chronic fatigue  HEAD/EYES:              Denies eye redness, blurry vision or sudden loss of vision, dry eyes, HA, temporal artery pain  ENT:                            Denies oral/nasal ulcers, recurrent sinus infections, dry mouth  RESPIRATORY:         No pleuritic pain, history of pleural effusions, hemoptysis, exertional dyspnea  CARDIOVASCULAR:             Denies chest pain, history of pericardial effusions  GASTRO:                    Denies heartburn, esophageal dysmotility, abdominal pain, nausea, vomiting, diarrhea, blood in the stool  HEMATOLOGIC:        No easy bruising, purpura, swollen lymph nodes  SKIN:                           Denies alopecia, ulcers, nodules, sun sensitivity, unexplained persistent rash   VASCULAR:                Denies edema, cyanosis, raynaud phenomenon  NEUROLOGIC:           Denies specific muscle weakness, paresthesias   PSYCHIATRIC:           No sleep disturbance / snoring, depression, anxiety  MSK:                           No morning stiffness >1 hour, SI joint pain, persistent joint swelling, persistent joint pain      PAST MEDICAL HISTORY    Past Medical History:   Diagnosis Date    Eczema 12/19/2012    RA (rheumatoid arthritis) ([facility]         Past Surgical History:   Procedure Laterality Date    HX HERNIA REPAIR      R inguinal at 3 yo    HX KNEE ARTHROSCOPY      L Meniscus Tear, L ACL replacement    HX OTHER SURGICAL      Exercise stress echo WNL pp 2012       FAMILY HISTORY    Family History   Problem Relation Age of Onset    Other Mother         hypoglycemia/borderline DM    Stroke Father 76    Arthritis-osteo Maternal Grandfather         ? rheumatoid    Diabetes Paternal Grandfather 61       SOCIAL HISTORY    Social History     Tobacco Use    Smoking status: Former Smoker     Packs/day: 0.20     Last attempt to quit: 1/15/2017     Years since quitting: 3.8    Smokeless tobacco: Never Used    Tobacco comment: 1 pack/week   Substance Use Topics    Alcohol use: Yes     Alcohol/week: 0.0 standard drinks     Comment: 1x.week 8-10 drinks    Drug use: No       MEDICATIONS    Current Outpatient Medications   Medication Sig Dispense Refill    etanercept (EnbreL Mini) 50 mg/mL (1 mL) crtg INJECT 1 CARTRIDGE SUBCUTANEOUSLY EVERY 7 DAYS 12 Cartridge 0    albuterol (PROVENTIL HFA, VENTOLIN HFA, PROAIR HFA) 90 mcg/actuation inhaler Take 2 Puffs by inhalation every four (4) hours as needed for Shortness of Breath. 1 Inhaler 1    Omega-3 Fatty Acids (FISH OIL) 500 mg cap Take  by mouth.  multivitamin (ONE A DAY) tablet Take 1 Tab by mouth daily.  methylPREDNISolone (MEDROL, PAU,) 4 mg tablet Take as directed on dose pack. 1 Dose Pack 0    amoxicillin (AMOXIL) 875 mg tablet Take 1 Tab by mouth two (2) times a day. 20 Tab 0    melatonin tab tablet Take  by mouth nightly.          ALLERGIES    No Known Allergies    PHYSICAL EXAMINATION    Visit Vitals  BP (!) 147/87   Pulse 76   Temp 97.1 °F (36.2 °C)   Resp 18   Wt 236 lb (107 kg)   BMI 35.88 kg/m²     Body mass index is 35.88 kg/m². General:  The patient is well developed, well nourished, alert, and in no apparent distress. Eyes: Sclera are anicteric. No conjunctival injection. HEENT:  Oropharynx is clear. No oral ulcers. Adequate salivary pooling. No cervical or supraclavicular lymphadenopathy. Lungs:  Clear to auscultation bilaterally, without wheeze or stridor. Normal respiratory effort. Cor:  Regular rate and rhythm. No murmur rub or gallop. Abdomen: Soft, non-tender, without hepatomegaly or masses. Extremities: No calf tenderness or edema. Warm and well perfused. Skin:  PIP and DIP extensor-predominant hyperkeratotic rash more c/w Gottron's than eczema. Neuro: Nonfocal, intact strength and muscle bulk proximally and distally  Musculoskeletal:    A comprehensive musculoskeletal exam was performed for all joints of each upper and lower extremity and assessed for swelling, tenderness and range of motion. Results are documented as below:  No evidence of synovitis in the small joints of the hands, wrists, shoulders, elbows, hips, knees or ankles. DATA REVIEW    Prior medical records were reviewed and if applicable are summarized as below:    Labs:   7/2019: Quant gold, HBV, HCV negative, + RF, CCP  5/2019: CRP normal, ESR 29, Cbc, cmp unremarkable    Imaging:   N/A    ASSESSMENT AND PLAN    A 48 y.o. male with hx of rheumatoid arthritis on Enbrel presents for a follow up visit, still in clinical remission. Continuing weekly Enbrel, reminded patient to update overdue labs. Possible he has an overlap with dermatomyositis, but in the absence of clinical ILD or weakness do not need to alter management; will screen with KESHIA for now and have low threshold to screen with myositis panel in the future.     # Seropositive rheumatoid arthritis:  - updating hand and foot x-rays  - continue Enbrel weekly    # Eczema:vs Gottron's  - topical as needed for rash  -KESHIA    # Medication Toxicity Monitoring:  - cbc, cmp q6mo while on Enbrel monotherapy  - Hepatitis B, C: negative 7/2019  - Quant gold: negative 7/2019  - Immunizations: We discussed receiving influenza, Prevar-13, and Pneumovax-23 vaccines as per the CDC guidelines for immunosuppressed patients. The patient voiced understanding of the aforementioned assessment and plan. Summary of plan was provided in the After Visit Summary patient instructions. I also provided education about MyChart setup and utility. Mr. Tristan Khan has a reminder for a \"due or due soon\" health maintenance. I have asked that he contact his primary care provider for follow-up on this health maintenance.     TODAY'S ORDERS    Orders Placed This Encounter    XR HAND RT MIN 3 V    XR HAND LT MIN 3 V    XR FOOT RT MIN 3 V    XR FOOT LT MIN 3 V    XR KNEE RT 3 V    XR KNEE LT 3 V    ANTI-NUCLEAR AB BY IFA (RDL)    C REACTIVE PROTEIN, QT    CBC WITH AUTOMATED DIFF    METABOLIC PANEL, COMPREHENSIVE    SED RATE (ESR)    etanercept (EnbreL Mini) 50 mg/mL (1 mL) toddg     Alexi Ibarra MD    Adult Rheumatology   [facility]  A Part of [facility], [address] [place] [address] Road   Phone [phone]  Fax [phone]

## 2021-02-05 ENCOUNTER — DOCUMENTATION ONLY (OUTPATIENT)
Dept: PHARMACY | Age: 51
End: 2021-02-05

## 2021-02-05 NOTE — PROGRESS NOTES
[facility] [address] [facility] Update    Date: 2/2/2021    Ryley Dewey Jr. 1970     Medication: Enbrel 50mg/mL Mini (3 month supply)    Prior Authorization: Through 4-    Co-pay $75 (Used Enbrel Credit Card)    Fill: 1/27/2021    Ship: 2/1/2021    Deliver: 2/2/2021    Next Fill Due: 3/31/2021    Pharmacist offered counseling to the patient. Handout provided.     Aldo Zimmerman, [address] at [facility],  Iris Tatum, [address]  phone: [phone]   fax: [phone]

## 2021-02-08 DIAGNOSIS — M05.9 SEROPOSITIVE RHEUMATOID ARTHRITIS (HCC): ICD-10-CM

## 2021-02-08 RX ORDER — ETANERCEPT 50 MG/ML
SOLUTION SUBCUTANEOUS
Qty: 2 CARTRIDGE | Refills: 11 | Status: SHIPPED | OUTPATIENT
Start: 2021-02-08 | End: 2021-02-22

## 2021-07-01 ENCOUNTER — OFFICE VISIT (OUTPATIENT)
Dept: INTERNAL MEDICINE CLINIC | Age: 51
End: 2021-07-01
Payer: COMMERCIAL

## 2021-07-01 VITALS
WEIGHT: 237 LBS | HEIGHT: 68 IN | DIASTOLIC BLOOD PRESSURE: 88 MMHG | HEART RATE: 62 BPM | BODY MASS INDEX: 35.92 KG/M2 | SYSTOLIC BLOOD PRESSURE: 130 MMHG | TEMPERATURE: 98.2 F | OXYGEN SATURATION: 96 %

## 2021-07-01 DIAGNOSIS — Z12.5 PROSTATE CANCER SCREENING: ICD-10-CM

## 2021-07-01 DIAGNOSIS — I10 HTN (HYPERTENSION), BENIGN: ICD-10-CM

## 2021-07-01 DIAGNOSIS — E78.00 HYPERCHOLESTEREMIA: ICD-10-CM

## 2021-07-01 DIAGNOSIS — R03.0 ELEVATED BP WITHOUT DIAGNOSIS OF HYPERTENSION: ICD-10-CM

## 2021-07-01 DIAGNOSIS — R73.03 PREDIABETES: ICD-10-CM

## 2021-07-01 DIAGNOSIS — E66.9 OBESITY (BMI 30-39.9): Primary | ICD-10-CM

## 2021-07-01 DIAGNOSIS — E66.01 SEVERE OBESITY (HCC): ICD-10-CM

## 2021-07-01 PROCEDURE — 99214 OFFICE O/P EST MOD 30 MIN: CPT | Performed by: INTERNAL MEDICINE

## 2021-07-01 PROCEDURE — 93000 ELECTROCARDIOGRAM COMPLETE: CPT | Performed by: INTERNAL MEDICINE

## 2021-07-01 RX ORDER — LISINOPRIL 10 MG/1
10 TABLET ORAL DAILY
Qty: 90 TABLET | Refills: 1 | Status: SHIPPED | OUTPATIENT
Start: 2021-07-01 | End: 2021-12-20

## 2021-07-01 NOTE — PROGRESS NOTES
Ronel Singh is a 48 y.o. male. He has a history of obstructive sleep apnea, using CPAP. Has a history of a stressful job. Has a history of being overweight and has gained weight over the last couple of years. He has had some borderline blood pressure readings. His father  of a stroke. His father had untreated hypertension and untreated diabetes. Mr. Nneka Diaz recently has been checking his blood pressure at home, and he brought in his phone and showed me some updated readings, and the highest readings were 154/92. He says he checks them various times a day. He said, previously, there was a time his BP was elevated when he was smoking, but when he quit smoking, his blood pressure improved. His caffeine intake is a couple of cups of coffee per day. He works from home. He exercises some. He is generally healthy. Denies shortness of breath, chest pain, or dizziness. He has had some anxiety. He says, even with his CPAP, he only gets about five hours of good sleep at night, but his CPAP, he says is effective. He gets good deep REM sleep. His blood pressure in the office today was a little elevated. S1, S2 was regular. He was in no acute distress. His neck was supple. Abdomen was soft. There was no abdominal bruits. Neurologically normal.  EKG was done. EKG, sinus bradycardia. Nonspecific changes. Labs will be done. I will start him on an ACE inhibitor. I will give him 10 mg of lisinopril. Went over potential side effects. I asked him to come back and see Roma Peguero in about 60 days to see how he is doing with it. Patient Active Problem List    Diagnosis    Severe obesity ([address]    Mild intermittent asthma without complication    Elevated BP without diagnosis of hypertension    Chronic allergic rhinitis    Alcohol consumption binge drinking    Elevated hemoglobin A1c    Hypercholesteremia    Obesity (BMI 30-39. 9)    Advance care planning     Discussed with patient today. Gave him paperwork/info to review.  Rheumatoid arthritis (HCC)     ccp strongly +      Eczema     Vitals:    07/01/21 1132   BP: 130/88   Pulse: 62   Temp: 98.2 °F (36.8 °C)   TempSrc: Temporal   SpO2: 96%   Weight: 237 lb (107.5 kg)   Height: 5' 8\" (1.727 m)     S1 and S2 normal, no murmurs, clicks, gallops or rubs. Regular rate and rhythm. Chest is clear; no wheezes or rales. No edema or JVD. Body mass index is 36.04 kg/m². 1. Obesity (BMI 30-39. 9)  Weight loss    2. Hypercholesteremia  follow    3. Elevated BP without diagnosis of hypertension  Treat with goal 130 / 80 or less  - METABOLIC PANEL, COMPREHENSIVE; Future  - LIPID PANEL; Future  - CBC WITH AUTOMATED DIFF; Future  - URINALYSIS W/ RFLX MICROSCOPIC; Future    4. Severe obesity ([address]  Working on it    5. Prostate cancer screening    - PSA SCREENING (SCREENING); Future    6. Prediabetes    - HEMOGLOBIN A1C WITH EAG; Future    7. HTN (hypertension), benign  Add ace  - AMB POC EKG ROUTINE W/ 12 LEADS, INTER & REP  - lisinopriL (PRINIVIL, ZESTRIL) 10 mg tablet; Take 1 Tablet by mouth daily. Dispense: 90 Tablet;  Refill: 1  Stress reduction reviewed

## 2021-07-01 NOTE — PROGRESS NOTES
Identified pt with two pt identifiers(name and ). Reviewed record in preparation for visit and have obtained necessary documentation. Chief Complaint   Patient presents with    Blood Pressure Check        Vitals:    21 1132   BP: 130/88   Pulse: 62   Temp: 98.2 °F (36.8 °C)   TempSrc: Temporal   SpO2: 96%   Weight: 237 lb (107.5 kg)   Height: 5' 8\" (1.727 m)   PainSc:   0 - No pain       Health Maintenance Due   Topic    A1C test (Diabetic or Prediabetic)     Shingrix Vaccine Age 50> (1 of 2)    Colorectal Cancer Screening Combo        Coordination of Care Questionnaire:  :   1) Have you been to an emergency room, urgent care, or hospitalized since your last visit? If yes, where when, and reason for visit? No       2. Have seen or consulted any other health care provider since your last visit? If yes, where when, and reason for visit?   No

## 2021-10-06 ENCOUNTER — OFFICE VISIT (OUTPATIENT)
Dept: INTERNAL MEDICINE CLINIC | Age: 51
End: 2021-10-06
Payer: COMMERCIAL

## 2021-10-06 VITALS
WEIGHT: 233.2 LBS | HEART RATE: 56 BPM | RESPIRATION RATE: 16 BRPM | SYSTOLIC BLOOD PRESSURE: 117 MMHG | OXYGEN SATURATION: 97 % | HEIGHT: 68 IN | TEMPERATURE: 98.3 F | DIASTOLIC BLOOD PRESSURE: 78 MMHG | BODY MASS INDEX: 35.34 KG/M2

## 2021-10-06 DIAGNOSIS — E11.9 TYPE 2 DIABETES MELLITUS WITHOUT COMPLICATION, WITHOUT LONG-TERM CURRENT USE OF INSULIN (HCC): Primary | ICD-10-CM

## 2021-10-06 DIAGNOSIS — M06.9 RHEUMATOID ARTHRITIS, INVOLVING UNSPECIFIED SITE, UNSPECIFIED WHETHER RHEUMATOID FACTOR PRESENT (HCC): ICD-10-CM

## 2021-10-06 DIAGNOSIS — E66.01 SEVERE OBESITY (HCC): ICD-10-CM

## 2021-10-06 DIAGNOSIS — G47.33 OSA (OBSTRUCTIVE SLEEP APNEA): ICD-10-CM

## 2021-10-06 DIAGNOSIS — G47.00 INSOMNIA, UNSPECIFIED TYPE: ICD-10-CM

## 2021-10-06 DIAGNOSIS — S76.211A INGUINAL STRAIN, RIGHT, INITIAL ENCOUNTER: ICD-10-CM

## 2021-10-06 DIAGNOSIS — F10.10 ALCOHOL CONSUMPTION BINGE DRINKING: ICD-10-CM

## 2021-10-06 DIAGNOSIS — E78.00 HYPERCHOLESTEREMIA: ICD-10-CM

## 2021-10-06 PROCEDURE — 99214 OFFICE O/P EST MOD 30 MIN: CPT | Performed by: PHYSICIAN ASSISTANT

## 2021-10-06 RX ORDER — CHOLECALCIFEROL (VITAMIN D3) 125 MCG
CAPSULE ORAL
COMMUNITY

## 2021-10-06 RX ORDER — METFORMIN HYDROCHLORIDE 500 MG/1
500 TABLET, FILM COATED, EXTENDED RELEASE ORAL DAILY
Qty: 90 TABLET | Refills: 1 | Status: SHIPPED | OUTPATIENT
Start: 2021-10-06 | End: 2021-10-12 | Stop reason: SDUPTHER

## 2021-10-06 NOTE — PROGRESS NOTES
Aidan Carrero. is a 48 y.o. male  Chief Complaint   Patient presents with    Hypertension    Cholesterol Problem     Visit Vitals  /78   Pulse (!) 56   Temp 98.3 °F (36.8 °C) (Temporal)   Resp 16   Ht 5' 8\" (1.727 m)   Wt 233 lb 3.2 oz (105.8 kg)   SpO2 97%   BMI 35.46 kg/m²      Health Maintenance Due   Topic Date Due    Eye Exam Retinal or Dilated  Never done    Colorectal Cancer Screening Combo  03/07/2017    Shingrix Vaccine Age 50> (1 of 2) Never done    Flu Vaccine (1) 09/01/2021       HPI  DM II - new dx at last check after being in prediabetic range x years. Lab Results   Component Value Date/Time    Hemoglobin A1c 6.5 (H) 07/01/2021 12:19 PM    Hemoglobin A1c 6.3 (H) 05/20/2019 11:14 AM    Hemoglobin A1c 6.1 (H) 11/20/2017 09:34 AM    Glucose 109 (H) 07/01/2021 12:19 PM    LDL, calculated 72.4 07/01/2021 12:19 PM    Creatinine 1.07 07/01/2021 12:19 PM     Currently taking:   Key Antihyperglycemic Medications     Patient is on no antihyperglycemic meds. Wt Readings from Last 3 Encounters:   10/06/21 233 lb 3.2 oz (105.8 kg)   07/01/21 237 lb (107.5 kg)   11/27/20 236 lb (107 kg)     Working out 6 days per week. Doing IF from 8 PM to 12 PM    SMITA - Using CPAP regularly. Moved heavy filing cabinets up 3 flights of stairs 2 days ago - discomfort in R groin persists. No noticeable bulge. Had hernia surgery in this area as a young child. ROS  Review of Systems   Respiratory: Negative for shortness of breath. Cardiovascular: Negative for chest pain and palpitations. Neurological: Negative for dizziness, loss of consciousness and weakness. EXAM  Physical Exam  Vitals and nursing note reviewed. Constitutional:       General: He is not in acute distress. Appearance: He is well-developed. HENT:      Head: Normocephalic and atraumatic. Neck:      Vascular: No JVD. Cardiovascular:      Rate and Rhythm: Normal rate and regular rhythm.       Heart sounds: Normal heart sounds. Pulmonary:      Effort: Pulmonary effort is normal. No respiratory distress. Breath sounds: Normal breath sounds. Genitourinary:     Testes: Normal.      Comments: Tenderness noted R groin, but no hernia. Musculoskeletal:         General: Normal range of motion. Cervical back: Neck supple. Skin:     General: Skin is warm and dry. Neurological:      Mental Status: He is alert and oriented to person, place, and time. Psychiatric:         Mood and Affect: Mood normal.         Behavior: Behavior normal.         Thought Content: Thought content normal.         Judgment: Judgment normal.       Diabetic foot exam:     Left: Reflexes 1+     Vibratory sensation normal    Proprioception normal   Sharp/dull discrimination normal    Filament test normal sensation with micro filament   Pulse DP: 2+ (normal)   Pulse PT: 2+ (normal)   Deformities: Mild - callus throughout B dorsal feet  Bruise noted L great toenail. Right: Reflexes 1+   Vibratory sensation normal   Proprioception normal   Sharp/dull discrimination normal   Filament test normal sensation with micro filament   Pulse DP: 2+ (normal)   Pulse PT: 2+ (normal)   Deformities: Mild - callus throughout B dorsal feet      ASSESSMENT/PLAN  Encounter Diagnoses     ICD-10-CM ICD-9-CM   1. Type 2 diabetes mellitus without complication, without long-term current use of insulin ([facility])  E11.9 250.00   2. Rheumatoid arthritis, involving unspecified site, unspecified whether rheumatoid factor present ([facility]  M06.9 714.0   3. Severe obesity ([facility]  E66.01 278.01   4. Hypercholesteremia  E78.00 272.0   5. Alcohol consumption binge drinking  F10.10 305.00   6. Insomnia, unspecified type  G47.00 780.52   7. SMITA (obstructive sleep apnea)  G47.33 327.23   8.  Inguinal strain, right, initial encounter  S76.211A 848.8     Orders Placed This Encounter    MICROALBUMIN, UR, RAND W/ MICROALB/CREAT RATIO    REFERRAL TO GENERAL SURGERY     DIABETES FOOT EXAM    metFORMIN (GLUMETZA ER) 500 mg TG24 24 hour tablet    melatonin 5 mg tablet    niacinamide (NICOTINAMIDE)    RESVERATROL PO     Encouraged pt to decrease alcohol.

## 2021-10-06 NOTE — PROGRESS NOTES
1. Have you been to the ER, urgent care clinic since your last visit? Hospitalized since your last visit?no      2. Have you seen or consulted any other health care providers outside of the [address] [place] [address] since your last visit? Include any pap smears or colon screening.  No  Chief Complaint   Patient presents with    Hypertension    Cholesterol Problem

## 2021-10-08 ENCOUNTER — PATIENT MESSAGE (OUTPATIENT)
Dept: INTERNAL MEDICINE CLINIC | Age: 51
End: 2021-10-08

## 2021-10-08 DIAGNOSIS — E11.9 TYPE 2 DIABETES MELLITUS WITHOUT COMPLICATION, WITHOUT LONG-TERM CURRENT USE OF INSULIN (HCC): ICD-10-CM

## 2021-10-08 RX ORDER — METFORMIN HYDROCHLORIDE 500 MG/1
500 TABLET, FILM COATED, EXTENDED RELEASE ORAL DAILY
Qty: 90 TABLET | Refills: 1 | Status: CANCELLED | OUTPATIENT
Start: 2021-10-08

## 2021-10-12 ENCOUNTER — TELEPHONE (OUTPATIENT)
Dept: SURGERY | Age: 51
End: 2021-10-12

## 2021-10-12 RX ORDER — METFORMIN HYDROCHLORIDE 500 MG/1
500 TABLET, FILM COATED, EXTENDED RELEASE ORAL DAILY
Qty: 90 TABLET | Refills: 1 | Status: SHIPPED | OUTPATIENT
Start: 2021-10-12 | End: 2021-11-07 | Stop reason: SDUPTHER

## 2021-10-12 NOTE — TELEPHONE ENCOUNTER
Referred to: Jermaine  Referred by: Norma Kitchen PA-C  Dx: Infected Inguinal strain  Appt type: NP    Called pt to schedule NP appt - no answer, unable to LVM    Sent [employer] Message:  Good afternoon! We received a referral from Dr. Ruslan Steiner for an inguinal strain. We called you and when we got no answer we tried to leave a voice mail, but were unable to do so. If you could call us back at [phone] we will gladly get you set up with an appointment with Dr. Gulshan Mitchell from our office. Thank you!   Shell

## 2021-10-15 ENCOUNTER — DOCUMENTATION ONLY (OUTPATIENT)
Dept: INTERNAL MEDICINE CLINIC | Age: 51
End: 2021-10-15

## 2021-10-15 NOTE — TELEPHONE ENCOUNTER
From: Talia Christy. To: Casey Nguyen PA-C  Sent: 10/8/2021 4:06 PM EDT  Subject: Prescription Question    Looks like my prescription didn't go through to the pharmacy. When you get back from vacation, would you mind checking? Thanks!

## 2021-10-15 NOTE — PROGRESS NOTES
Attempted PA, Ins states this has been resolved, no additional PA required. Further inquiries can be made by patient if needed.

## 2021-11-07 DIAGNOSIS — E11.9 TYPE 2 DIABETES MELLITUS WITHOUT COMPLICATION, WITHOUT LONG-TERM CURRENT USE OF INSULIN (HCC): ICD-10-CM

## 2021-11-08 RX ORDER — METFORMIN HYDROCHLORIDE 500 MG/1
500 TABLET, FILM COATED, EXTENDED RELEASE ORAL DAILY
Qty: 90 TABLET | Refills: 1 | Status: SHIPPED | OUTPATIENT
Start: 2021-11-08 | End: 2021-12-03 | Stop reason: ALTCHOICE

## 2021-11-15 ENCOUNTER — DOCUMENTATION ONLY (OUTPATIENT)
Dept: INTERNAL MEDICINE CLINIC | Age: 51
End: 2021-11-15

## 2021-11-15 NOTE — PROGRESS NOTES
PA in process for [facility] AT [facility] ER) 500 mg TG24 24 hour tablet

## 2021-11-17 DIAGNOSIS — M05.9 SEROPOSITIVE RHEUMATOID ARTHRITIS (HCC): ICD-10-CM

## 2021-11-17 DIAGNOSIS — Z79.899 ONGOING USE OF POSSIBLY TOXIC MEDICATION: Primary | ICD-10-CM

## 2021-11-18 RX ORDER — ETANERCEPT 50 MG/ML
SOLUTION SUBCUTANEOUS
Qty: 4 ML | Refills: 2 | Status: SHIPPED | OUTPATIENT
Start: 2021-11-18 | End: 2022-01-19

## 2021-11-20 ENCOUNTER — PATIENT MESSAGE (OUTPATIENT)
Dept: INTERNAL MEDICINE CLINIC | Age: 51
End: 2021-11-20

## 2021-11-22 NOTE — TELEPHONE ENCOUNTER
From: Demetrice Manuel. To: Stephanie Victor PA-C  Sent: 11/20/2021 11:52 AM EST  Subject: Hernia issue gone    Hi Latia,  I am follow up on my hernia issue. It has gone and I can resume normal activities. I did not schedule the referral to have a scan of it. I'm fine to carry on without it. If I have an issue, I will let you know.

## 2021-12-02 ENCOUNTER — DOCUMENTATION ONLY (OUTPATIENT)
Dept: INTERNAL MEDICINE CLINIC | Age: 51
End: 2021-12-02

## 2021-12-03 RX ORDER — METFORMIN HYDROCHLORIDE 500 MG/1
500 TABLET, EXTENDED RELEASE ORAL
Qty: 90 TABLET | Refills: 1 | Status: SHIPPED | OUTPATIENT
Start: 2021-12-03 | End: 2021-12-13 | Stop reason: SDUPTHER

## 2021-12-13 ENCOUNTER — OFFICE VISIT (OUTPATIENT)
Dept: INTERNAL MEDICINE CLINIC | Age: 51
End: 2021-12-13
Payer: COMMERCIAL

## 2021-12-13 VITALS
BODY MASS INDEX: 35.61 KG/M2 | OXYGEN SATURATION: 97 % | SYSTOLIC BLOOD PRESSURE: 130 MMHG | RESPIRATION RATE: 14 BRPM | DIASTOLIC BLOOD PRESSURE: 77 MMHG | TEMPERATURE: 98 F | WEIGHT: 235 LBS | HEART RATE: 87 BPM | HEIGHT: 68 IN

## 2021-12-13 DIAGNOSIS — Z23 NEEDS FLU SHOT: ICD-10-CM

## 2021-12-13 DIAGNOSIS — F10.10 ALCOHOL CONSUMPTION BINGE DRINKING: ICD-10-CM

## 2021-12-13 DIAGNOSIS — E66.01 SEVERE OBESITY (HCC): ICD-10-CM

## 2021-12-13 DIAGNOSIS — E11.9 TYPE 2 DIABETES MELLITUS WITHOUT COMPLICATION, WITHOUT LONG-TERM CURRENT USE OF INSULIN (HCC): ICD-10-CM

## 2021-12-13 DIAGNOSIS — M06.9 RHEUMATOID ARTHRITIS, INVOLVING UNSPECIFIED SITE, UNSPECIFIED WHETHER RHEUMATOID FACTOR PRESENT (HCC): Primary | ICD-10-CM

## 2021-12-13 DIAGNOSIS — E78.00 HYPERCHOLESTEREMIA: ICD-10-CM

## 2021-12-13 PROCEDURE — 90686 IIV4 VACC NO PRSV 0.5 ML IM: CPT | Performed by: PHYSICIAN ASSISTANT

## 2021-12-13 PROCEDURE — 90471 IMMUNIZATION ADMIN: CPT | Performed by: PHYSICIAN ASSISTANT

## 2021-12-13 PROCEDURE — 99214 OFFICE O/P EST MOD 30 MIN: CPT | Performed by: PHYSICIAN ASSISTANT

## 2021-12-13 RX ORDER — METFORMIN HYDROCHLORIDE 500 MG/1
500 TABLET, EXTENDED RELEASE ORAL
Qty: 90 TABLET | Refills: 1 | Status: SHIPPED | OUTPATIENT
Start: 2021-12-13 | End: 2022-03-01 | Stop reason: SDUPTHER

## 2021-12-13 NOTE — PROGRESS NOTES
Melany Puentes. is a 46 y.o. male  Chief Complaint   Patient presents with    Follow-up     Visit Vitals  /77 (BP 1 Location: Left upper arm, BP Patient Position: Sitting, BP Cuff Size: Large adult)   Pulse 87   Temp 98 °F (36.7 °C) (Temporal)   Resp 14   Ht 5' 8\" (1.727 m)   Wt 235 lb (106.6 kg)   SpO2 97%   BMI 35.73 kg/m²      Health Maintenance Due   Topic Date Due    Eye Exam Retinal or Dilated  Never done    Colorectal Cancer Screening Combo  03/07/2017    Shingrix Vaccine Age 50> (1 of 2) Never done    Flu Vaccine (1) 09/01/2021    COVID-19 Vaccine (3 - Booster for Pfizer series) 10/09/2021       HPI  Follow up - hx RA - seeing Rheum regularly. Hx Diabetes. Taking Metformin regularly. Lab Results   Component Value Date/Time    Hemoglobin A1c 6.5 (H) 07/01/2021 12:19 PM    Hemoglobin A1c 6.3 (H) 05/20/2019 11:14 AM    Hemoglobin A1c 6.1 (H) 11/20/2017 09:34 AM    Glucose 109 (H) 07/01/2021 12:19 PM    Microalbumin/Creat ratio (mg/g creat)  10/06/2021 09:54 AM     Cannot calculate ratio due to microalbumin result outside reportable range. Microalbumin,urine random <0.50 10/06/2021 09:54 AM    LDL, calculated 72.4 07/01/2021 12:19 PM    Creatinine 1.07 07/01/2021 12:19 PM     Currently taking:   Key Antihyperglycemic Medications             metFORMIN ER (GLUCOPHAGE XR) 500 mg tablet (Taking) Take 1 Tablet by mouth daily (with dinner). Indications: type 2 diabetes mellitus        Weight has not changed. Wt Readings from Last 3 Encounters:   12/13/21 235 lb (106.6 kg)   10/06/21 233 lb 3.2 oz (105.8 kg)   07/01/21 237 lb (107.5 kg)     Hx elevated BP - normal in office today. ROS  Review of Systems   Respiratory: Negative for shortness of breath. Cardiovascular: Negative for chest pain and palpitations. Neurological: Negative for dizziness, loss of consciousness and weakness. EXAM  Physical Exam  Vitals and nursing note reviewed.    Constitutional:       General: He is not in acute distress. Appearance: He is well-developed. HENT:      Head: Normocephalic and atraumatic. Neck:      Vascular: No JVD. Cardiovascular:      Rate and Rhythm: Normal rate and regular rhythm. Heart sounds: Normal heart sounds. Pulmonary:      Effort: Pulmonary effort is normal. No respiratory distress. Breath sounds: Normal breath sounds. Musculoskeletal:      Cervical back: Neck supple. Skin:     General: Skin is warm and dry. Neurological:      Mental Status: He is alert and oriented to person, place, and time. Psychiatric:         Mood and Affect: Mood normal.         Behavior: Behavior normal.         Thought Content: Thought content normal.         Judgment: Judgment normal.       ASSESSMENT/PLAN  Encounter Diagnoses     ICD-10-CM ICD-9-CM   1. Rheumatoid arthritis, involving unspecified site, unspecified whether rheumatoid factor present ([facility]  M06.9 714.0   2. Severe obesity ([facility]  E66.01 278.01   3. Hypercholesteremia  E78.00 272.0   4. Alcohol consumption binge drinking  F10.10 305.00   5. Type 2 diabetes mellitus without complication, without long-term current use of insulin ([facility])  E11.9 250.00   6. Needs flu shot  Z23 V04.81     Orders Placed This Encounter    Influenza Virus Vaccine QUAD, PF Syr 6 Months + (Flulaval, Fluzone, Fluarix 33403)    HEMOGLOBIN A1C WITH EAG    METABOLIC PANEL, COMPREHENSIVE    Refill metFORMIN ER (GLUCOPHAGE XR) 500 mg tablet     Continue routine follow ups with rheumatology. Ask Rheum about Shingrix.

## 2021-12-13 NOTE — PROGRESS NOTES
Reviewed record in preparation for visit and have obtained necessary documentation. Identified pt with two pt identifiers(name and ). Chief Complaint   Patient presents with    Follow-up     Blood pressure 130/77, pulse 87, temperature 98 °F (36.7 °C), temperature source Temporal, resp. rate 14, height 5' 8\" (1.727 m), weight 235 lb (106.6 kg), SpO2 97 %. Health Maintenance Due   Topic Date Due    Eye Exam  Never done    Colorectal Screening  2017    Shingles Vaccine (1 of 2) Never done    Yearly Flu Vaccine (1) 2021    COVID-19 Vaccine (3 - Booster for Pfizer series) 10/09/2021       Mr. Heather Gonzalez has a reminder for a \"due or due soon\" health maintenance. I have asked that he discuss this further with his primary care provider for follow-up on this health maintenance. Coordination of Care Questionnaire:  :     1) Have you been to an emergency room, urgent care clinic since your last visit? no   Hospitalized since your last visit? no             2) Have you seen or consulted any other health care providers outside of [address] [place] [address] since your last visit? no  (Include any pap smears or colon screenings in this section.)    3) In the event something were to happen to you and you were unable to speak on your behalf, do you have an Advance Directive/ Living Will in place stating your wishes?  NO

## 2021-12-13 NOTE — PATIENT INSTRUCTIONS
Vaccine Information Statement    Influenza (Flu) Vaccine (Inactivated or Recombinant): What You Need to Know    Many vaccine information statements are available in Greek and other languages. See www.immunize.org/vis. Hojas de información sobre vacunas están disponibles en español y en muchos otros idiomas. Visite www.immunize.org/vis. 1. Why get vaccinated? Influenza vaccine can prevent influenza (flu). Flu is a contagious disease that spreads around the [place] [facility] every year, usually between October and May. Anyone can get the flu, but it is more dangerous for some people. Infants and young children, people 72 years and older, pregnant people, and people with certain health conditions or a weakened immune system are at greatest risk of flu complications. Pneumonia, bronchitis, sinus infections, and ear infections are examples of flu-related complications. If you have a medical condition, such as heart disease, cancer, or diabetes, flu can make it worse. Flu can cause fever and chills, sore throat, muscle aches, fatigue, cough, headache, and runny or stuffy nose. Some people may have vomiting and diarrhea, though this is more common in children than adults. In an average year, thousands of people in the [facility] die from flu, and many more are hospitalized. Flu vaccine prevents millions of illnesses and flu-related visits to the doctor each year. 2. Influenza vaccines     CDC recommends everyone 6 months and older get vaccinated every flu season. Children 6 months through 6years of age may need 2 doses during a single flu season. Everyone else needs only 1 dose each flu season. It takes about 2 weeks for protection to develop after vaccination. There are many flu viruses, and they are always changing. Each year a new flu vaccine is made to protect against the influenza viruses believed to be likely to cause disease in the upcoming flu season.  Even when the vaccine doesnt exactly match these viruses, it may still provide some protection. Influenza vaccine does not cause flu. Influenza vaccine may be given at the same time as other vaccines. 3. Talk with your health care provider    Tell your vaccination provider if the person getting the vaccine:   Has had an allergic reaction after a previous dose of influenza vaccine, or has any severe, life-threatening allergies    Has ever had Guillain-Barré Syndrome (also called GBS)    In some cases, your health care provider may decide to postpone influenza vaccination until a future visit. Influenza vaccine can be administered at any time during pregnancy. People who are or will be pregnant during influenza season should receive inactivated influenza vaccine. People with minor illnesses, such as a cold, may be vaccinated. People who are moderately or severely ill should usually wait until they recover before getting influenza vaccine. Your health care provider can give you more information. 4. Risks of a vaccine reaction     Soreness, redness, and swelling where the shot is given, fever, muscle aches, and headache can happen after influenza vaccination.  There may be a very small increased risk of Guillain-Barré Syndrome (GBS) after inactivated influenza vaccine (the flu shot). Venida Michelle children who get the flu shot along with pneumococcal vaccine (PCV13) and/or DTaP vaccine at the same time might be slightly more likely to have a seizure caused by fever. Tell your health care provider if a child who is getting flu vaccine has ever had a seizure. People sometimes faint after medical procedures, including vaccination. Tell your provider if you feel dizzy or have vision changes or ringing in the ears. As with any medicine, there is a very remote chance of a vaccine causing a severe allergic reaction, other serious injury, or death. 5. What if there is a serious problem?     An allergic reaction could occur after the vaccinated person leaves the clinic. If you see signs of a severe allergic reaction (hives, swelling of the face and throat, difficulty breathing, a fast heartbeat, dizziness, or weakness), call 9-1-1 and get the person to the nearest hospital.    For other signs that concern you, call your health care provider. Adverse reactions should be reported to the Vaccine Adverse Event Reporting System (VAERS). Your health care provider will usually file this report, or you can do it yourself. Visit the VAERS website at www.vaers. [facility].gov or call 4-747.487.2999. VAERS is only for reporting reactions, and VAERS staff members do not give medical advice. 6. The National Vaccine Injury Compensation Program    The [facility] Vaccine Injury Compensation Program (VICP) is a federal program that was created to compensate people who may have been injured by certain vaccines. Claims regarding alleged injury or death due to vaccination have a time limit for filing, which may be as short as two years. Visit the VICP website at www.[facility].gov/vaccinecompensation or call 0-879.530.8543 to learn about the program and about filing a claim. 7. How can I learn more?  Ask your health care provider.  Call your local or state health department.  Visit the website of the Food and Drug Administration (FDA) for vaccine package inserts and additional information at www.fda.gov/vaccines-blood-biologics/vaccines.  Contact the Centers for Disease Control and Prevention (CDC):  - Call 5-456.888.8729 (1-800-CDC-INFO) or  - Visit CDCs influenza website at www.cdc.gov/flu. Vaccine Information Statement   Inactivated Influenza Vaccine   8/6/2021  [id number]   Department of Health and Human Services  Centers for Disease Control and Prevention    Office Use Only

## 2021-12-19 DIAGNOSIS — I10 HTN (HYPERTENSION), BENIGN: ICD-10-CM

## 2021-12-20 RX ORDER — LISINOPRIL 10 MG/1
TABLET ORAL
Qty: 90 TABLET | Refills: 1 | Status: SHIPPED | OUTPATIENT
Start: 2021-12-20 | End: 2022-06-10

## 2022-01-19 DIAGNOSIS — M05.9 SEROPOSITIVE RHEUMATOID ARTHRITIS (HCC): ICD-10-CM

## 2022-01-19 DIAGNOSIS — Z79.899 ONGOING USE OF POSSIBLY TOXIC MEDICATION: ICD-10-CM

## 2022-01-19 RX ORDER — ETANERCEPT 50 MG/ML
SOLUTION SUBCUTANEOUS
Qty: 4 ML | Refills: 2 | Status: SHIPPED | OUTPATIENT
Start: 2022-01-19 | End: 2022-04-21

## 2022-03-01 DIAGNOSIS — E11.9 TYPE 2 DIABETES MELLITUS WITHOUT COMPLICATION, WITHOUT LONG-TERM CURRENT USE OF INSULIN (HCC): ICD-10-CM

## 2022-03-02 RX ORDER — METFORMIN HYDROCHLORIDE 500 MG/1
500 TABLET, EXTENDED RELEASE ORAL
Qty: 90 TABLET | Refills: 1 | Status: SHIPPED | OUTPATIENT
Start: 2022-03-02

## 2022-03-19 PROBLEM — J30.9 CHRONIC ALLERGIC RHINITIS: Status: ACTIVE | Noted: 2017-11-20

## 2022-03-19 PROBLEM — E66.01 SEVERE OBESITY (HCC): Status: ACTIVE | Noted: 2020-11-27

## 2022-03-20 PROBLEM — R03.0 ELEVATED BP WITHOUT DIAGNOSIS OF HYPERTENSION: Status: ACTIVE | Noted: 2017-11-20

## 2022-03-20 PROBLEM — J45.20 MILD INTERMITTENT ASTHMA WITHOUT COMPLICATION: Status: ACTIVE | Noted: 2020-01-17

## 2022-04-05 DIAGNOSIS — M05.9 SEROPOSITIVE RHEUMATOID ARTHRITIS (HCC): ICD-10-CM

## 2022-04-05 DIAGNOSIS — Z79.899 ONGOING USE OF POSSIBLY TOXIC MEDICATION: ICD-10-CM

## 2022-04-05 RX ORDER — ETANERCEPT 50 MG/ML
SOLUTION SUBCUTANEOUS
Qty: 4 ML | Refills: 2 | OUTPATIENT
Start: 2022-04-05

## 2022-04-05 NOTE — TELEPHONE ENCOUNTER
Had asked in November to schedule an appointment, did labs but no visit. Will refill Enbrel to last until visit once he schedules an appointment.

## 2022-04-21 DIAGNOSIS — Z79.899 ONGOING USE OF POSSIBLY TOXIC MEDICATION: ICD-10-CM

## 2022-04-21 DIAGNOSIS — M05.9 SEROPOSITIVE RHEUMATOID ARTHRITIS (HCC): ICD-10-CM

## 2022-04-21 RX ORDER — ETANERCEPT 50 MG/ML
SOLUTION SUBCUTANEOUS
Qty: 4 ML | Refills: 2 | Status: SHIPPED | OUTPATIENT
Start: 2022-04-21

## 2022-04-21 NOTE — TELEPHONE ENCOUNTER
Please coordinate with patient--December labs are OK, but he needs to schedule followup, has been 1.5 years

## 2022-06-13 ENCOUNTER — TELEPHONE (OUTPATIENT)
Dept: SURGERY | Age: 52
End: 2022-06-13

## 2022-06-13 NOTE — TELEPHONE ENCOUNTER
Called patient in attempt to move him appointment to the morning of 6/20 due to Talib Manning being out of office in the afternoon. No answer, left voicemail advising to return call.

## 2022-06-20 ENCOUNTER — OFFICE VISIT (OUTPATIENT)
Dept: SURGERY | Age: 52
End: 2022-06-20
Payer: COMMERCIAL

## 2022-06-20 VITALS
OXYGEN SATURATION: 98 % | RESPIRATION RATE: 18 BRPM | HEIGHT: 68 IN | TEMPERATURE: 98.1 F | SYSTOLIC BLOOD PRESSURE: 134 MMHG | HEART RATE: 81 BPM | BODY MASS INDEX: 36.68 KG/M2 | WEIGHT: 242 LBS | DIASTOLIC BLOOD PRESSURE: 74 MMHG

## 2022-06-20 DIAGNOSIS — R10.31 RIGHT GROIN PAIN: Primary | ICD-10-CM

## 2022-06-20 PROCEDURE — 99202 OFFICE O/P NEW SF 15 MIN: CPT | Performed by: SURGERY

## 2022-06-20 NOTE — PROGRESS NOTES
1. Have you been to the ER, urgent care clinic since your last visit? Hospitalized since your last visit? No    2. Have you seen or consulted any other health care providers outside of the [address] [place] [address] since your last visit? Include any pap smears or colon screening.  No

## 2022-06-20 NOTE — PROGRESS NOTES
Juan R Argueta. is a 46 y.o. male who is referred by Shayla Bustillo PA-C, for further evaluation of right groin pain and possible right inguinal hernia. Mr. Sheela Trotter tells me that he began experiencing pain in his right groin several months ago after moving file cabinets at work. No associated right groin pain with coughing, sneezing or exertion. No visible bulge in right groin. No h/o chronic cough or constipation. No nausea or vomiting. No left groin pain or left groin bulge. He has otherwise been in his usual state of health. Past Medical History:   Diagnosis Date    Eczema 2012    RA (rheumatoid arthritis) ([facility]     Right groin pain 2022     Past Surgical History:   Procedure Laterality Date    HX HERNIA REPAIR  1974    R inguinal    HX KNEE ARTHROSCOPY      L Meniscus Tear, L ACL replacement    HX OTHER SURGICAL      Exercise stress echo WNL pp 2012     Family History   Problem Relation Age of Onset    Other Mother         hypoglycemia/borderline DM    Stroke Father 76    OSTEOARTHRITIS Maternal Grandfather         ? rheumatoid    Diabetes Paternal Grandfather 61     Social History     Socioeconomic History    Marital status: SINGLE    Number of children: 2   Occupational History    Occupation: [employer]   Tobacco Use    Smoking status: Former Smoker     Packs/day: 0.20     Quit date: 1/15/2017     Years since quittin.4    Smokeless tobacco: Never Used    Tobacco comment: 1 pack/week   Vaping Use    Vaping Use: Never used   Substance and Sexual Activity    Alcohol use:  Yes     Alcohol/week: 16.0 standard drinks     Types: 16 Standard drinks or equivalent per week    Drug use: No    Sexual activity: Yes     Partners: Female   Other Topics Concern    Caffeine Concern Yes     Comment: red bull in AM, 20 oz coke in afternoon, sometimes coffee in afternoon    Exercise Yes     Comment: restarted recently 3/2016 - goal: cardio 3x/week   Social History Narrative    2 children (13 and 10 yo in 2016 - oldest with ADD, but otherwise very healthy) - live in [place] Pt cares for his children every other weekend and his oldest child stays with him every Wednesday. Pt has \"reasonably good\" relationships with children's mothers. He notes that half of his paycheck goes to child support each month, which is stressful. Lives with fiance: Danii Lamas. Has a small dog named Sarah. Review of systems negative except as noted. Review of Systems   Constitutional: Negative for chills and fever. Respiratory: Negative for cough. Gastrointestinal: Positive for abdominal pain (Right groin. ). Negative for constipation, nausea and vomiting. Physical Exam  Vitals reviewed. Constitutional:       General: He is not in acute distress. Appearance: Normal appearance. He is obese. HENT:      Head: Normocephalic and atraumatic. Eyes:      General: No scleral icterus. Cardiovascular:      Rate and Rhythm: Normal rate and regular rhythm. Pulmonary:      Effort: Pulmonary effort is normal.      Breath sounds: Normal breath sounds. Abdominal:      General: There is no distension. Palpations: Abdomen is soft. Tenderness: There is no abdominal tenderness. There is no guarding or rebound. Hernia: No hernia is present. There is no hernia in the left inguinal area or right inguinal area. Comments: No apparent direct or indirect inguinal hernia bilaterally. Musculoskeletal:         General: Normal range of motion. Cervical back: Neck supple. Lymphadenopathy:      Cervical: No cervical adenopathy. Neurological:      General: No focal deficit present. Mental Status: He is alert. ASSESSMENT and PLAN  Mr. Nicolle Whittington is a 45 yo man with right groin pain and a possible right inguinal hernia. Explained to Mr. Nicolle Whittington that it is unclear whether or not he has a right inguinal hernia.  Will check a right groin ultrasound and will see him following that to review findings with him. If found to have a right inguinal hernia, then he should benefit from repair. Activity as tolerated for now. Discussed plan with Mr. Al Mitchell and he is agreeable.       CC: Linda Holder PA-C

## 2022-07-06 ENCOUNTER — HOSPITAL ENCOUNTER (OUTPATIENT)
Dept: ULTRASOUND IMAGING | Age: 52
Discharge: HOME | End: 2022-07-06
Attending: SURGERY
Payer: COMMERCIAL

## 2022-07-06 DIAGNOSIS — R10.31 RIGHT GROIN PAIN: ICD-10-CM

## 2022-07-06 PROCEDURE — 76882 US LMTD JT/FCL EVL NVASC XTR: CPT

## 2022-09-04 DIAGNOSIS — I10 HTN (HYPERTENSION), BENIGN: ICD-10-CM

## 2022-09-06 RX ORDER — LISINOPRIL 10 MG/1
TABLET ORAL
Qty: 90 TABLET | Refills: 0 | Status: SHIPPED | OUTPATIENT
Start: 2022-09-06

## 2022-09-29 ENCOUNTER — TELEPHONE (OUTPATIENT)
Dept: INTERNAL MEDICINE CLINIC | Age: 52
End: 2022-09-29

## 2022-11-04 ENCOUNTER — OFFICE VISIT (OUTPATIENT)
Dept: RHEUMATOLOGY | Age: 52
End: 2022-11-04
Payer: COMMERCIAL

## 2022-11-04 VITALS
HEART RATE: 61 BPM | WEIGHT: 237 LBS | HEIGHT: 68 IN | DIASTOLIC BLOOD PRESSURE: 72 MMHG | RESPIRATION RATE: 16 BRPM | OXYGEN SATURATION: 94 % | BODY MASS INDEX: 35.92 KG/M2 | TEMPERATURE: 98.3 F | SYSTOLIC BLOOD PRESSURE: 106 MMHG

## 2022-11-04 DIAGNOSIS — M05.9 SEROPOSITIVE RHEUMATOID ARTHRITIS (HCC): Primary | ICD-10-CM

## 2022-11-04 DIAGNOSIS — Z79.899 ONGOING USE OF POSSIBLY TOXIC MEDICATION: ICD-10-CM

## 2022-11-04 PROCEDURE — 99215 OFFICE O/P EST HI 40 MIN: CPT | Performed by: INTERNAL MEDICINE

## 2022-11-04 RX ORDER — POLYETHYLENE GLYCOL 3350, SODIUM CHLORIDE, SODIUM BICARBONATE, POTASSIUM CHLORIDE 420; 11.2; 5.72; 1.48 G/4L; G/4L; G/4L; G/4L
POWDER, FOR SOLUTION ORAL
COMMUNITY
Start: 2022-08-23

## 2022-11-04 NOTE — PROGRESS NOTES
Chief Complaint   Patient presents with    Joint Pain     1. Have you been to the ER, urgent care clinic since your last visit? Hospitalized since your last visit? No    2. Have you seen or consulted any other health care providers outside of the [address] [place] [address] since your last visit? Include any pap smears or colon screening.  No

## 2022-11-04 NOTE — PATIENT INSTRUCTIONS
1) Continue to take weekly Enbrel injections. 2) Let me know if you plan a surgery for your hernia repair. I recommend that you stay on Enbrel through surgery but your surgeon may want you to hold it. In which case, I would hold it for 2 weeks before and 2 weeks after your surgery. 3) Check labs now and again in 6 months. 4) Follow up in one year. Let me know if you have any question or concerns in the meantime.

## 2022-11-04 NOTE — PROGRESS NOTES
REASON FOR VISIT    Patient presents for a follow up visit for seropositive rheumatoid arthritis. HISTORY OF DISEASE: Seropositive Rheumatoid Arthritis    Year of diagnosis:   First visit with me: 2019  Cumulative disease manifestations: small joint arthritis  Positive serologies/labs: RF, CCP  Negative serologies/labs: Other co-morbidities: HLD     Past treatment history:  Prednisone:   Non-biologic DMARD: Arava for 2 years in the past, Plaquenil in more remote past.  Biologic: Enbrel weekly (-present)  Other:       HISTORY OF PRESENT ILLNESS     Pt returns for a follow up. Pt still takes Enbrel injections with good tolerance. He has been able to stay on weekly Enbrel injections since his LV. Pt refrains from eating bad foods and drinking alcohol except on special occasions to keep from flaring. Pt says that his work as a  for [employer] has  down and he is less stressed. He mostly works from home now. Pt denies cold color changes in his hands. Pt says that the rash on his knuckles as not gotten any worse or better.  He denies rashes on his palms or soles     REVIEW OF SYSTEMS    Positives as per history  Negatives as follows:  CONSTITUTlONAL:    Denies unexplained persistent fevers, weight change, chronic fatigue  HEAD/EYES:              Denies eye redness, blurry vision or sudden loss of vision, dry eyes, HA, temporal artery pain  ENT:                            Denies oral/nasal ulcers, recurrent sinus infections, dry mouth  RESPIRATORY:         No pleuritic pain, history of pleural effusions, hemoptysis, exertional dyspnea  CARDIOVASCULAR:             Denies chest pain, history of pericardial effusions  GASTRO:                    Denies heartburn, esophageal dysmotility, abdominal pain, nausea, vomiting, diarrhea, blood in the stool  HEMATOLOGIC:        No easy bruising, purpura, swollen lymph nodes  SKIN:                           Denies alopecia, ulcers, nodules, sun sensitivity, unexplained persistent rash   VASCULAR:                Denies edema, cyanosis, raynaud phenomenon  NEUROLOGIC:           Denies specific muscle weakness, paresthesias   PSYCHIATRIC:           No sleep disturbance / snoring, depression, anxiety  MSK:                           No morning stiffness >1 hour, SI joint pain, persistent joint swelling, persistent joint pain      PAST MEDICAL HISTORY    Past Medical History:   Diagnosis Date    Eczema 2012    RA (rheumatoid arthritis) ([address]     Right groin pain 2022        Past Surgical History:   Procedure Laterality Date    HX HERNIA REPAIR  1974    R inguinal    HX KNEE ARTHROSCOPY      L Meniscus Tear, L ACL replacement    HX OTHER SURGICAL      Exercise stress echo WNL pp        FAMILY HISTORY    Family History   Problem Relation Age of Onset    Other Mother         hypoglycemia/borderline DM    Stroke Father 76    OSTEOARTHRITIS Maternal Grandfather         ? rheumatoid    Diabetes Paternal Grandfather 61       SOCIAL HISTORY    Social History     Tobacco Use    Smoking status: Former     Packs/day: 0.20     Types: Cigarettes     Quit date: 1/15/2017     Years since quittin.8    Smokeless tobacco: Never    Tobacco comments:     1 pack/week   Vaping Use    Vaping Use: Every day    Substances: Nicotine    Devices: Disposable   Substance Use Topics    Alcohol use: Yes     Alcohol/week: 16.0 standard drinks     Types: 16 Standard drinks or equivalent per week    Drug use: No       MEDICATIONS    Current Outpatient Medications   Medication Sig Dispense Refill    peg-electrolyte soln (NULYTELY) 420 gram solution TAKE 2 LITERS BY MOUTH TWICE A DAY AS DIRECTED BY OFFICE      lisinopriL (PRINIVIL, ZESTRIL) 10 mg tablet TAKE 1 TABLET BY MOUTH EVERY DAY 90 Tablet 0    etanercept (EnbreL Mini) 50 mg/mL (1 mL) crtg INJECT 1 CARTRIDGE (50 MG) UNDER THE SKIN EVERY 7 DAYS.  Schedule followup with Dr. Lang Diop before we can refill this prescription further. 4 mL 2    metFORMIN ER (GLUCOPHAGE XR) 500 mg tablet Take 1 Tablet by mouth daily (with dinner). Indications: type 2 diabetes mellitus 90 Tablet 1    melatonin 5 mg tablet Take 15 mg every evening for sleep.      niacinamide (NICOTINAMIDE) by Does Not Apply route. RESVERATROL PO Take  by mouth. albuterol (PROVENTIL HFA, VENTOLIN HFA, PROAIR HFA) 90 mcg/actuation inhaler Take 2 Puffs by inhalation every four (4) hours as needed for Shortness of Breath. (Patient not taking: Reported on 6/20/2022) 1 Inhaler 1    multivitamin (ONE A DAY) tablet Take 1 Tab by mouth daily. ALLERGIES    No Known Allergies    PHYSICAL EXAMINATION    Visit Vitals  /72 (BP 1 Location: Left upper arm, BP Patient Position: Sitting, BP Cuff Size: Adult long)   Pulse 61   Temp 98.3 °F (36.8 °C) (Oral)   Resp 16   Ht 5' 8\" (1.727 m)   Wt 237 lb (107.5 kg)   SpO2 94%   BMI 36.04 kg/m²     General:  The patient is well developed, well nourished, alert, and in no apparent distress. Eyes: Sclera are anicteric. No conjunctival injection. HEENT:  Oropharynx is clear. No oral ulcers. Adequate salivary pooling. No cervical or supraclavicular lymphadenopathy. Lungs:  Clear to auscultation bilaterally, without wheeze or stridor. Normal respiratory effort. Cor:  Regular rate and rhythm. No murmur rub or gallop. Abdomen: Soft, non-tender, without hepatomegaly or masses. Extremities: No calf tenderness or edema. Warm and well perfused. Skin:  Interval resolved PIP and DIP extensor-predominant hyperkeratotic rash. Neuro: Nonfocal, intact strength and muscle bulk proximally and distally  Musculoskeletal:    A comprehensive musculoskeletal exam was performed for all joints of each upper and lower extremity and assessed for swelling, tenderness and range of motion.  Results are documented as below:  No evidence of synovitis in the small joints of the hands, wrists, shoulders, elbows, hips, knees or ankles. DATA REVIEW    Prior medical records were reviewed and if applicable are summarized as below:    Labs:   12/13/21: HGB A1c 6.5, Cr 1.08, LFT WNL  7/1/21: HGB A1c 6.5, PSA 0.9, Cr 1.07, LFT WNL, Triglyceride 283, CBC WNL, UA normal  7/2019: Quant gold, HBV, HCV negative, + RF, CCP  5/2019: CRP normal, ESR 29, Cbc, cmp unremarkable    Imaging:   N/A    ASSESSMENT AND PLAN    A 46 y.o. male with hx of seropositive rheumatoid arthritis on Enbrel presents for a follow up visit after loss to followup, still in clinical remission on Enbrel monotherapy. Updating deep organ function and quantiferon screen, and continuing weekly Enbrel barring abnormalities. 1. Seropositive rheumatoid arthritis (HCC)  - Cont weekly Enbrel  - C REACTIVE PROTEIN, QT; Future  - CBC WITH AUTOMATED DIFF; Future  - METABOLIC PANEL, COMPREHENSIVE; Future  - SED RATE (ESR); Future  - QUANTIFERON-TB GOLD PLUS; Future  - C REACTIVE PROTEIN, QT; Future  - CBC WITH AUTOMATED DIFF; Future  - METABOLIC PANEL, COMPREHENSIVE; Future  - SED RATE (ESR); Future    2. Ongoing use of possibly toxic medication  - CBC WITH AUTOMATED DIFF; Future  - METABOLIC PANEL, COMPREHENSIVE; Future  - QUANTIFERON-TB GOLD PLUS; Future  - CBC WITH AUTOMATED DIFF; Future  - METABOLIC PANEL, COMPREHENSIVE; Future      # Medication Toxicity Monitoring:  - cbc, cmp q6mo while on Enbrel monotherapy, due now  - Hepatitis B, C: negative 7/2019  - Quant gold: negative 7/2019, updating now  - Immunizations: We previously discussed receiving influenza, Prevar-13, and Pneumovax-23 vaccines as per the CDC guidelines for immunosuppressed patients    Patient Instructions   1) Continue to take weekly Enbrel injections. 2) Let me know if you plan a surgery for your hernia repair. I recommend that you stay on Enbrel through surgery but your surgeon may want you to hold it. In which case, I would hold it for 2 weeks before and 2 weeks after your surgery.      3) Check labs now and again in 6 months. 4) Follow up in one year. Let me know if you have any question or concerns in the meantime.         TODAY'S ORDERS    Orders Placed This Encounter    QUANTIFERON-TB GOLD PLUS    C REACTIVE PROTEIN, QT    CBC WITH AUTOMATED DIFF    METABOLIC PANEL, COMPREHENSIVE    SED RATE (ESR)    C REACTIVE PROTEIN, QT    CBC WITH AUTOMATED DIFF    METABOLIC PANEL, COMPREHENSIVE    SED RATE (ESR)    peg-electrolyte soln (NULYTELY) 420 gram solution

## 2022-11-14 ENCOUNTER — OFFICE VISIT (OUTPATIENT)
Dept: INTERNAL MEDICINE CLINIC | Age: 52
End: 2022-11-14
Payer: COMMERCIAL

## 2022-11-14 VITALS
HEIGHT: 68 IN | OXYGEN SATURATION: 96 % | SYSTOLIC BLOOD PRESSURE: 112 MMHG | HEART RATE: 66 BPM | DIASTOLIC BLOOD PRESSURE: 72 MMHG | WEIGHT: 242.2 LBS | BODY MASS INDEX: 36.71 KG/M2 | RESPIRATION RATE: 18 BRPM | TEMPERATURE: 98.3 F

## 2022-11-14 DIAGNOSIS — E11.9 TYPE 2 DIABETES MELLITUS WITHOUT COMPLICATION, WITHOUT LONG-TERM CURRENT USE OF INSULIN (HCC): ICD-10-CM

## 2022-11-14 DIAGNOSIS — Z00.00 ANNUAL PHYSICAL EXAM: Primary | ICD-10-CM

## 2022-11-14 DIAGNOSIS — Z23 NEEDS FLU SHOT: ICD-10-CM

## 2022-11-14 DIAGNOSIS — E66.01 SEVERE OBESITY (HCC): ICD-10-CM

## 2022-11-14 DIAGNOSIS — M06.9 RHEUMATOID ARTHRITIS, INVOLVING UNSPECIFIED SITE, UNSPECIFIED WHETHER RHEUMATOID FACTOR PRESENT (HCC): ICD-10-CM

## 2022-11-14 PROCEDURE — 99396 PREV VISIT EST AGE 40-64: CPT | Performed by: PHYSICIAN ASSISTANT

## 2022-11-14 PROCEDURE — 90471 IMMUNIZATION ADMIN: CPT | Performed by: PHYSICIAN ASSISTANT

## 2022-11-14 PROCEDURE — 90686 IIV4 VACC NO PRSV 0.5 ML IM: CPT | Performed by: PHYSICIAN ASSISTANT

## 2022-11-14 PROCEDURE — 99214 OFFICE O/P EST MOD 30 MIN: CPT | Performed by: PHYSICIAN ASSISTANT

## 2022-11-14 NOTE — PROGRESS NOTES
Reviewed record in preparation for visit and have obtained necessary documentation. Identified pt with two pt identifiers(name and ). Chief Complaint   Patient presents with    Physical     Blood pressure 112/72, pulse 66, temperature 98.3 °F (36.8 °C), temperature source Temporal, resp. rate 18, height 5' 8\" (1.727 m), weight 242 lb 3.2 oz (109.9 kg), SpO2 96 %. Health Maintenance Due   Topic Date Due    Eye Exam  Never done    Hepatitis B Vaccine (1 of 3 - Risk 3-dose series) Never done    Shingles Vaccine (1 of 2) Never done    COVID-19 Vaccine (3 - Booster for Pfizer series) 2021    Cholesterol Test   2022    Yearly Flu Vaccine (1) 2022    Diabetic Foot Care  10/06/2022    Albumin Urine Test  10/06/2022       Mr. Ana Hutchison has a reminder for a \"due or due soon\" health maintenance. I have asked that he discuss this further with his primary care provider for follow-up on this health maintenance. Coordination of Care Questionnaire:  :     1) Have you been to an emergency room, urgent care clinic since your last visit? no   Hospitalized since your last visit? no             2) Have you seen or consulted any other health care providers outside of [address] [place] [address] since your last visit? no        3) In the event something were to happen to you and you were unable to speak on your behalf, do you have an Advance Directive/ Living Will in place stating your wishes?  NO

## 2022-11-14 NOTE — PROGRESS NOTES
Aidan Flowers is a 46 y.o. male  Chief Complaint   Patient presents with    Physical     Visit Vitals  /72 (BP 1 Location: Left upper arm, BP Patient Position: Sitting, BP Cuff Size: Adult)   Pulse 66   Temp 98.3 °F (36.8 °C) (Temporal)   Resp 18   Ht 5' 8\" (1.727 m)   Wt 242 lb 3.2 oz (109.9 kg)   SpO2 96%   BMI 36.83 kg/m²      Health Maintenance Due   Topic Date Due    Eye Exam Retinal or Dilated  Never done    Hepatitis B Vaccine (1 of 3 - Risk 3-dose series) Never done    Shingrix Vaccine Age 50> (1 of 2) Never done    COVID-19 Vaccine (3 - Booster for Pfizer series) 06/04/2021    Lipid Screen  07/01/2022    Flu Vaccine (1) 08/01/2022    Foot Exam Q1  10/06/2022    MICROALBUMIN Q1  10/06/2022     HPI  DM II - controlled at last check. Overdue for recheck. Lab Results   Component Value Date/Time    Hemoglobin A1c 6.5 (H) 12/13/2021 03:22 PM    Hemoglobin A1c 6.5 (H) 07/01/2021 12:19 PM    Hemoglobin A1c 6.3 (H) 05/20/2019 11:14 AM    Glucose 98 12/13/2021 03:22 PM    Microalbumin/Creat ratio (mg/g creat)  10/06/2021 09:54 AM     Cannot calculate ratio due to microalbumin result outside reportable range. Microalbumin,urine random <0.50 10/06/2021 09:54 AM    LDL, calculated 72.4 07/01/2021 12:19 PM    Creatinine 1.08 12/13/2021 03:22 PM     Currently taking:   Key Antihyperglycemic Medications               metFORMIN ER (GLUCOPHAGE XR) 500 mg tablet Take 1 Tablet by mouth daily (with dinner). Indications: type 2 diabetes mellitus          Has been working on weight, but has room for improvement in diet. Wt Readings from Last 3 Encounters:   11/04/22 237 lb (107.5 kg)   06/20/22 242 lb (109.8 kg)   12/13/21 235 lb (106.6 kg)     Hx RA - seeing Rheum regularly. ROS  Review of Systems   Constitutional:  Negative for fever and weight loss. HENT:  Negative for congestion, hearing loss and sore throat. Eyes:  Negative for blurred vision.    Respiratory:  Negative for cough and shortness of breath. Cardiovascular:  Negative for chest pain, palpitations and leg swelling. Gastrointestinal:  Negative for abdominal pain, blood in stool, constipation, diarrhea, heartburn, melena, nausea and vomiting. Genitourinary:  Negative for dysuria, frequency, hematuria and urgency. Musculoskeletal:  Negative for back pain, joint pain and neck pain. Neurological:  Negative for dizziness, seizures, loss of consciousness, weakness and headaches. Endo/Heme/Allergies:  Negative for environmental allergies. Psychiatric/Behavioral:  Negative for depression and substance abuse. The patient is not nervous/anxious and does not have insomnia. EXAM  Physical Exam  Vitals and nursing note reviewed. Constitutional:       General: He is not in acute distress. Appearance: He is well-developed. HENT:      Head: Normocephalic and atraumatic. Right Ear: External ear normal.      Left Ear: External ear normal.   Eyes:      Conjunctiva/sclera: Conjunctivae normal.   Neck:      Thyroid: No thyromegaly. Vascular: No JVD. Cardiovascular:      Rate and Rhythm: Normal rate and regular rhythm. Heart sounds: Normal heart sounds. Pulmonary:      Effort: Pulmonary effort is normal. No respiratory distress. Breath sounds: Normal breath sounds. Abdominal:      General: Bowel sounds are normal. There is no distension. Palpations: Abdomen is soft. There is no mass. Tenderness: There is no abdominal tenderness. There is no guarding or rebound. Musculoskeletal:      Cervical back: Neck supple. Lymphadenopathy:      Cervical: No cervical adenopathy. Skin:     General: Skin is warm and dry. Neurological:      Mental Status: He is alert and oriented to person, place, and time. Psychiatric:         Behavior: Behavior normal.         Thought Content: Thought content normal.         Judgment: Judgment normal.     ASSESSMENT/PLAN  Encounter Diagnoses     ICD-10-CM ICD-9-CM   1.  Annual physical exam  Z00.00 V70.0   2. Type 2 diabetes mellitus without complication, without long-term current use of insulin (HCC)  E11.9 250.00   3. Rheumatoid arthritis, involving unspecified site, unspecified whether rheumatoid factor present ([facility]  M06.9 714.0   4. Severe obesity ([facility]  E66.01 278.01   5. Needs flu shot  Z23 V04.81     Orders Placed This Encounter    Influenza Virus Vaccine QUAD, PF Syr 6 Months + (Flulaval, Fluzone, Fluarix [id number])    LIPID PANEL    TSH 3RD GENERATION    PSA SCREENING (SCREENING)    HEMOGLOBIN A1C WITH EAG    MICROALBUMIN, UR, RAND W/ MICROALB/CREAT RATIO     Discussed diet/exercise and options for/importance of losing weight.

## 2022-11-14 NOTE — PATIENT INSTRUCTIONS
Vaccine Information Statement    Influenza (Flu) Vaccine (Inactivated or Recombinant): What You Need to Know    Many vaccine information statements are available in [place] and other languages. See www.immunize.org/vis. Hojas de información sobre vacunas están disponibles en español y en muchos otros idiomas. Visite www.immunize.org/vis. 1. Why get vaccinated? Influenza vaccine can prevent influenza (flu). Flu is a contagious disease that spreads around the [facility] every year, usually between October and May. Anyone can get the flu, but it is more dangerous for some people. Infants and young children, people 72 years and older, pregnant people, and people with certain health conditions or a weakened immune system are at greatest risk of flu complications. Pneumonia, bronchitis, sinus infections, and ear infections are examples of flu-related complications. If you have a medical condition, such as heart disease, cancer, or diabetes, flu can make it worse. Flu can cause fever and chills, sore throat, muscle aches, fatigue, cough, headache, and runny or stuffy nose. Some people may have vomiting and diarrhea, though this is more common in children than adults. In an average year, thousands of people in the [facility] die from flu, and many more are hospitalized. Flu vaccine prevents millions of illnesses and flu-related visits to the doctor each year. 2. Influenza vaccines     CDC recommends everyone 6 months and older get vaccinated every flu season. Children 6 months through 6years of age may need 2 doses during a single flu season. Everyone else needs only 1 dose each flu season. It takes about 2 weeks for protection to develop after vaccination. There are many flu viruses, and they are always changing. Each year a new flu vaccine is made to protect against the influenza viruses believed to be likely to cause disease in the upcoming flu season.  Even when the vaccine doesnt exactly match these viruses, it may still provide some protection. Influenza vaccine does not cause flu. Influenza vaccine may be given at the same time as other vaccines. 3. Talk with your health care provider    Tell your vaccination provider if the person getting the vaccine:  Has had an allergic reaction after a previous dose of influenza vaccine, or has any severe, life-threatening allergies   Has ever had Guillain-Barré Syndrome (also called GBS)    In some cases, your health care provider may decide to postpone influenza vaccination until a future visit. Influenza vaccine can be administered at any time during pregnancy. People who are or will be pregnant during influenza season should receive inactivated influenza vaccine. People with minor illnesses, such as a cold, may be vaccinated. People who are moderately or severely ill should usually wait until they recover before getting influenza vaccine. Your health care provider can give you more information. 4. Risks of a vaccine reaction    Soreness, redness, and swelling where the shot is given, fever, muscle aches, and headache can happen after influenza vaccination. There may be a very small increased risk of Guillain-Barré Syndrome (GBS) after inactivated influenza vaccine (the flu shot). [facility] children who get the flu shot along with pneumococcal vaccine (PCV13) and/or DTaP vaccine at the same time might be slightly more likely to have a seizure caused by fever. Tell your health care provider if a child who is getting flu vaccine has ever had a seizure. People sometimes faint after medical procedures, including vaccination. Tell your provider if you feel dizzy or have vision changes or ringing in the ears. As with any medicine, there is a very remote chance of a vaccine causing a severe allergic reaction, other serious injury, or death. 5. What if there is a serious problem?     An allergic reaction could occur after the vaccinated person leaves the clinic. If you see signs of a severe allergic reaction (hives, swelling of the face and throat, difficulty breathing, a fast heartbeat, dizziness, or weakness), call 9-1-1 and get the person to the nearest hospital.    For other signs that concern you, call your health care provider. Adverse reactions should be reported to the Vaccine Adverse Event Reporting System (VAERS). Your health care provider will usually file this report, or you can do it yourself. Visit the VAERS website at www.vaers. [facility].gov or call 7-120.975.7814. VAERS is only for reporting reactions, and VAERS staff members do not give medical advice. 6. The National Vaccine Injury Compensation Program    The [facility] Vaccine Injury Compensation Program (VICP) is a federal program that was created to compensate people who may have been injured by certain vaccines. Claims regarding alleged injury or death due to vaccination have a time limit for filing, which may be as short as two years. Visit the VICP website at www.[facility].gov/vaccinecompensation or call 7-603.847.8050 to learn about the program and about filing a claim. 7. How can I learn more? Ask your health care provider. Call your local or state health department. Visit the website of the Food and Drug Administration (FDA) for vaccine package inserts and additional information at www.fda.gov/vaccines-blood-biologics/vaccines. Contact the Centers for Disease Control and Prevention (CDC): Call 2-564.109.7523 (1-800-CDC-INFO) or  Visit CDCs influenza website at www.cdc.gov/flu. Vaccine Information Statement   Inactivated Influenza Vaccine   8/6/2021  42 CINDY Bullock 293AE-10   Department of Health and Human Services  Centers for Disease Control and Prevention    Office Use Only

## 2022-11-19 LAB
CHOLEST SERPL-MCNC: 191 MG/DL (ref 100–199)
EST. AVERAGE GLUCOSE BLD GHB EST-MCNC: 157 MG/DL
HBA1C MFR BLD: 7.1 % (ref 4.8–5.6)
HDLC SERPL-MCNC: 40 MG/DL
IMP & REVIEW OF LAB RESULTS: NORMAL
LDLC SERPL CALC-MCNC: 127 MG/DL (ref 0–99)
PSA SERPL-MCNC: 0.7 NG/ML (ref 0–4)
TRIGL SERPL-MCNC: 136 MG/DL (ref 0–149)
TSH SERPL DL<=0.005 MIU/L-ACNC: 1.21 UIU/ML (ref 0.45–4.5)
VLDLC SERPL CALC-MCNC: 24 MG/DL (ref 5–40)

## 2022-11-21 LAB
ALBUMIN SERPL-MCNC: 4.2 G/DL (ref 3.8–4.9)
ALBUMIN/GLOB SERPL: 1.3 {RATIO} (ref 1.2–2.2)
ALP SERPL-CCNC: 75 IU/L (ref 44–121)
ALT SERPL-CCNC: 24 IU/L (ref 0–44)
AST SERPL-CCNC: 17 IU/L (ref 0–40)
BASOPHILS # BLD AUTO: 0 X10E3/UL (ref 0–0.2)
BASOPHILS NFR BLD AUTO: 0 %
BILIRUB SERPL-MCNC: 0.6 MG/DL (ref 0–1.2)
BUN SERPL-MCNC: 13 MG/DL (ref 6–24)
BUN/CREAT SERPL: 14 (ref 9–20)
CALCIUM SERPL-MCNC: 9.4 MG/DL (ref 8.7–10.2)
CHLORIDE SERPL-SCNC: 97 MMOL/L (ref 96–106)
CO2 SERPL-SCNC: 22 MMOL/L (ref 20–29)
CREAT SERPL-MCNC: 0.94 MG/DL (ref 0.76–1.27)
CRP SERPL-MCNC: 3 MG/L (ref 0–10)
EGFR: 98 ML/MIN/1.73
EOSINOPHIL # BLD AUTO: 0.3 X10E3/UL (ref 0–0.4)
EOSINOPHIL NFR BLD AUTO: 4 %
ERYTHROCYTE [DISTWIDTH] IN BLOOD BY AUTOMATED COUNT: 12.5 % (ref 11.6–15.4)
ERYTHROCYTE [SEDIMENTATION RATE] IN BLOOD BY WESTERGREN METHOD: 44 MM/HR (ref 0–30)
GAMMA INTERFERON BACKGROUND BLD IA-ACNC: 0.06 IU/ML
GLOBULIN SER CALC-MCNC: 3.2 G/DL (ref 1.5–4.5)
GLUCOSE SERPL-MCNC: 86 MG/DL (ref 70–99)
HCT VFR BLD AUTO: 45 % (ref 37.5–51)
HGB BLD-MCNC: 15.2 G/DL (ref 13–17.7)
IMM GRANULOCYTES # BLD AUTO: 0 X10E3/UL (ref 0–0.1)
IMM GRANULOCYTES NFR BLD AUTO: 0 %
LYMPHOCYTES # BLD AUTO: 1.2 X10E3/UL (ref 0.7–3.1)
LYMPHOCYTES NFR BLD AUTO: 18 %
M TB IFN-G BLD-IMP: NEGATIVE
M TB IFN-G CD4+ BCKGRND COR BLD-ACNC: 0.07 IU/ML
MCH RBC QN AUTO: 30.5 PG (ref 26.6–33)
MCHC RBC AUTO-ENTMCNC: 33.8 G/DL (ref 31.5–35.7)
MCV RBC AUTO: 90 FL (ref 79–97)
MITOGEN IGNF BLD-ACNC: >10 IU/ML
MONOCYTES # BLD AUTO: 0.5 X10E3/UL (ref 0.1–0.9)
MONOCYTES NFR BLD AUTO: 8 %
NEUTROPHILS # BLD AUTO: 4.5 X10E3/UL (ref 1.4–7)
NEUTROPHILS NFR BLD AUTO: 70 %
PLATELET # BLD AUTO: 251 X10E3/UL (ref 150–450)
POTASSIUM SERPL-SCNC: 4.2 MMOL/L (ref 3.5–5.2)
PROT SERPL-MCNC: 7.4 G/DL (ref 6–8.5)
QUANTIFERON INCUBATION, QF1T: NORMAL
QUANTIFERON TB2 AG: 0.09 IU/ML
RBC # BLD AUTO: 4.99 X10E6/UL (ref 4.14–5.8)
SERVICE CMNT-IMP: NORMAL
SODIUM SERPL-SCNC: 136 MMOL/L (ref 134–144)
WBC # BLD AUTO: 6.4 X10E3/UL (ref 3.4–10.8)

## 2022-11-23 DIAGNOSIS — E11.9 TYPE 2 DIABETES MELLITUS WITHOUT COMPLICATION, WITHOUT LONG-TERM CURRENT USE OF INSULIN (HCC): ICD-10-CM

## 2022-11-23 RX ORDER — METFORMIN HYDROCHLORIDE 500 MG/1
500 TABLET, EXTENDED RELEASE ORAL
Qty: 90 TABLET | Refills: 1 | Status: SHIPPED | OUTPATIENT
Start: 2022-11-23

## 2022-11-23 NOTE — PROGRESS NOTES
Your diabetes is getting worse and your LDL is no longer controlled. Decrease carbohydrates (bread, potatoes, rice, pasta/noodles, cereal, sugar, sweets), avoid liquid calories (non-diet soda, gatorade, sweet tea, juice, alcohol), and exercise at least a little bit every day. Please schedule an appointment (virtual is fine) if you don't have room/motivation to change these things so we can adjust medication. Here is a site with good information: https://diabetes. org/healthy-living/recipes-nutrition     Liver enzymes, kidney function, thyroid, PSA, and electrolytes are all normal/acceptable. Good!

## 2022-11-29 ENCOUNTER — TELEPHONE (OUTPATIENT)
Dept: RHEUMATOLOGY | Age: 52
End: 2022-11-29

## 2022-11-29 DIAGNOSIS — Z79.899 ONGOING USE OF POSSIBLY TOXIC MEDICATION: ICD-10-CM

## 2022-11-29 DIAGNOSIS — M05.9 SEROPOSITIVE RHEUMATOID ARTHRITIS (HCC): ICD-10-CM

## 2022-11-29 NOTE — TELEPHONE ENCOUNTER
Received PA renewal request for Enbrel. Lab Results   Component Value Date/Time    Sodium 136 11/15/2022 01:35 PM    Potassium 4.2 11/15/2022 01:35 PM    Chloride 97 11/15/2022 01:35 PM    CO2 22 11/15/2022 01:35 PM    Anion gap 7 12/13/2021 03:22 PM    Glucose 86 11/15/2022 01:35 PM    BUN 13 11/15/2022 01:35 PM    Creatinine 0.94 11/15/2022 01:35 PM    BUN/Creatinine ratio 14 11/15/2022 01:35 PM    GFR est AA >60 12/13/2021 03:22 PM    GFR est non-AA >60 12/13/2021 03:22 PM    Calcium 9.4 11/15/2022 01:35 PM    Bilirubin, total 0.6 11/15/2022 01:35 PM    Alk.  phosphatase 75 11/15/2022 01:35 PM    Protein, total 7.4 11/15/2022 01:35 PM    Albumin 4.2 11/15/2022 01:35 PM    Globulin 4.7 (H) 12/13/2021 03:22 PM    A-G Ratio 1.3 11/15/2022 01:35 PM    ALT (SGPT) 24 11/15/2022 01:35 PM    AST (SGOT) 17 11/15/2022 01:35 PM     Lab Results   Component Value Date/Time    WBC 6.4 11/15/2022 01:35 PM    HGB (POC) 13.8 09/20/2016 11:37 AM    HGB 15.2 11/15/2022 01:35 PM    HCT (POC) 42.0 09/20/2016 11:37 AM    HCT 45.0 11/15/2022 01:35 PM    PLATELET 223 82/70/1095 01:35 PM    MCV 90 11/15/2022 01:35 PM

## 2022-11-30 ENCOUNTER — TELEPHONE (OUTPATIENT)
Dept: RHEUMATOLOGY | Age: 52
End: 2022-11-30

## 2022-11-30 RX ORDER — ETANERCEPT 50 MG/ML
SOLUTION SUBCUTANEOUS
Qty: 4 ML | Refills: 2 | Status: SHIPPED | OUTPATIENT
Start: 2022-11-30

## 2022-11-30 NOTE — TELEPHONE ENCOUNTER
Eric Lazo for clarification on situation. They state they spoke with patient and it is already shipped on its way. No further actions required.

## 2022-11-30 NOTE — TELEPHONE ENCOUNTER
Pt called office to follow up on renewal enbrel prescription. [facility] is waiting for a PA from the office . Please advise.     Phone # [phone]

## 2022-12-01 ENCOUNTER — TELEPHONE (OUTPATIENT)
Dept: RHEUMATOLOGY | Age: 52
End: 2022-12-01

## 2022-12-01 DIAGNOSIS — M05.9 SEROPOSITIVE RHEUMATOID ARTHRITIS (HCC): Primary | ICD-10-CM

## 2022-12-01 RX ORDER — PREDNISONE 5 MG/1
TABLET ORAL
Qty: 40 TABLET | Refills: 0 | Status: SHIPPED | OUTPATIENT
Start: 2022-12-01 | End: 2022-12-17

## 2022-12-01 RX ORDER — ETANERCEPT 50 MG/ML
50 SOLUTION SUBCUTANEOUS
Qty: 2 EACH | Refills: 0 | Status: SHIPPED | COMMUNITY
Start: 2022-12-01

## 2022-12-01 NOTE — TELEPHONE ENCOUNTER
Called, discussed with patient  Currently polyarticular joint pain and swelling  Still no rash flare  Has done well in past with prednisone. Last A1c had edged up to 7.1 but he has cut way back on sweets and carbs now. Waiting for Enbrel Mini to arrive in 7-10 days. He will come by to  2 sample SureClick pens to tide him over. Calling in prednisone 20mg taper.

## 2022-12-03 DIAGNOSIS — I10 HTN (HYPERTENSION), BENIGN: ICD-10-CM

## 2022-12-04 RX ORDER — LISINOPRIL 10 MG/1
TABLET ORAL
Qty: 90 TABLET | Refills: 1 | Status: SHIPPED | OUTPATIENT
Start: 2022-12-04

## 2022-12-05 ENCOUNTER — DOCUMENTATION ONLY (OUTPATIENT)
Dept: PHARMACY | Age: 52
End: 2022-12-05

## 2022-12-05 NOTE — PROGRESS NOTES
[facility] at [facility] Update    Date: 12/05/22    Kelin Score. 1970     Medication: Enbrel mini cartridges    Prior Authorization: approved through 11/30/23    Pharmacy will call the patient to inform them to set up delivery.     [address] at [place] [address] [place], [address]  phone: [phone]   fax: [phone]

## 2023-05-17 RX ORDER — POLYETHYLENE GLYCOL 3350, SODIUM CHLORIDE, SODIUM BICARBONATE, POTASSIUM CHLORIDE 420; 11.2; 5.72; 1.48 G/4L; G/4L; G/4L; G/4L
POWDER, FOR SOLUTION ORAL
COMMUNITY
Start: 2022-08-23 | End: 2023-07-05

## 2023-05-17 RX ORDER — METFORMIN HYDROCHLORIDE 500 MG/1
500 TABLET, EXTENDED RELEASE ORAL
COMMUNITY
Start: 2022-11-23 | End: 2023-05-30

## 2023-05-17 RX ORDER — CHOLECALCIFEROL (VITAMIN D3) 125 MCG
CAPSULE ORAL
COMMUNITY

## 2023-05-17 RX ORDER — MEDROXYPROGESTERONE ACETATE 150 MG/ML
50 INJECTION, SUSPENSION INTRAMUSCULAR
COMMUNITY
Start: 2022-12-01

## 2023-05-17 RX ORDER — ETANERCEPT 50 MG/ML
SOLUTION SUBCUTANEOUS
COMMUNITY
Start: 2023-02-17 | End: 2023-07-05

## 2023-05-17 RX ORDER — ALBUTEROL SULFATE 90 UG/1
2 AEROSOL, METERED RESPIRATORY (INHALATION) EVERY 4 HOURS PRN
COMMUNITY
Start: 2020-10-23

## 2023-05-17 RX ORDER — LISINOPRIL 10 MG/1
1 TABLET ORAL DAILY
COMMUNITY
Start: 2022-12-04 | End: 2023-05-26

## 2023-05-26 DIAGNOSIS — I10 ESSENTIAL (PRIMARY) HYPERTENSION: ICD-10-CM

## 2023-05-26 RX ORDER — LISINOPRIL 10 MG/1
TABLET ORAL
Qty: 90 TABLET | Refills: 0 | Status: SHIPPED | OUTPATIENT
Start: 2023-05-26

## 2023-05-30 DIAGNOSIS — E11.9 TYPE 2 DIABETES MELLITUS WITHOUT COMPLICATIONS (HCC): ICD-10-CM

## 2023-05-30 RX ORDER — METFORMIN HYDROCHLORIDE 500 MG/1
TABLET, EXTENDED RELEASE ORAL
Qty: 90 TABLET | Refills: 0 | Status: SHIPPED | OUTPATIENT
Start: 2023-05-30

## 2023-07-05 ENCOUNTER — OFFICE VISIT (OUTPATIENT)
Age: 53
End: 2023-07-05
Payer: COMMERCIAL

## 2023-07-05 VITALS
HEIGHT: 68 IN | BODY MASS INDEX: 34.07 KG/M2 | DIASTOLIC BLOOD PRESSURE: 72 MMHG | OXYGEN SATURATION: 98 % | HEART RATE: 55 BPM | RESPIRATION RATE: 14 BRPM | SYSTOLIC BLOOD PRESSURE: 113 MMHG | TEMPERATURE: 98.6 F | WEIGHT: 224.8 LBS

## 2023-07-05 DIAGNOSIS — Z00.00 ANNUAL PHYSICAL EXAM: ICD-10-CM

## 2023-07-05 DIAGNOSIS — E66.01 SEVERE OBESITY (HCC): ICD-10-CM

## 2023-07-05 DIAGNOSIS — Z87.891 PERSONAL HISTORY OF TOBACCO USE: ICD-10-CM

## 2023-07-05 DIAGNOSIS — E11.9 TYPE 2 DIABETES MELLITUS WITHOUT COMPLICATION, WITHOUT LONG-TERM CURRENT USE OF INSULIN (HCC): ICD-10-CM

## 2023-07-05 DIAGNOSIS — Z00.00 ANNUAL PHYSICAL EXAM: Primary | ICD-10-CM

## 2023-07-05 LAB
ERYTHROCYTE [DISTWIDTH] IN BLOOD BY AUTOMATED COUNT: 13.4 % (ref 11.5–14.5)
EST. AVERAGE GLUCOSE BLD GHB EST-MCNC: 126 MG/DL
HBA1C MFR BLD: 6 % (ref 4–5.6)
HCT VFR BLD AUTO: 41.9 % (ref 36.6–50.3)
HGB BLD-MCNC: 13.7 G/DL (ref 12.1–17)
MCH RBC QN AUTO: 30.3 PG (ref 26–34)
MCHC RBC AUTO-ENTMCNC: 32.7 G/DL (ref 30–36.5)
MCV RBC AUTO: 92.7 FL (ref 80–99)
NRBC # BLD: 0 K/UL (ref 0–0.01)
NRBC BLD-RTO: 0 PER 100 WBC
PLATELET # BLD AUTO: 254 K/UL (ref 150–400)
PMV BLD AUTO: 11.2 FL (ref 8.9–12.9)
RBC # BLD AUTO: 4.52 M/UL (ref 4.1–5.7)
WBC # BLD AUTO: 6.4 K/UL (ref 4.1–11.1)

## 2023-07-05 PROCEDURE — 99396 PREV VISIT EST AGE 40-64: CPT | Performed by: PHYSICIAN ASSISTANT

## 2023-07-05 PROCEDURE — G0296 VISIT TO DETERM LDCT ELIG: HCPCS | Performed by: PHYSICIAN ASSISTANT

## 2023-07-05 RX ORDER — NIACINAMIDE 500 MG
500 TABLET ORAL DAILY
COMMUNITY

## 2023-07-05 SDOH — ECONOMIC STABILITY: FOOD INSECURITY: WITHIN THE PAST 12 MONTHS, THE FOOD YOU BOUGHT JUST DIDN'T LAST AND YOU DIDN'T HAVE MONEY TO GET MORE.: NEVER TRUE

## 2023-07-05 SDOH — ECONOMIC STABILITY: FOOD INSECURITY: WITHIN THE PAST 12 MONTHS, YOU WORRIED THAT YOUR FOOD WOULD RUN OUT BEFORE YOU GOT MONEY TO BUY MORE.: NEVER TRUE

## 2023-07-05 SDOH — ECONOMIC STABILITY: INCOME INSECURITY: HOW HARD IS IT FOR YOU TO PAY FOR THE VERY BASICS LIKE FOOD, HOUSING, MEDICAL CARE, AND HEATING?: NOT HARD AT ALL

## 2023-07-05 SDOH — ECONOMIC STABILITY: HOUSING INSECURITY
IN THE LAST 12 MONTHS, WAS THERE A TIME WHEN YOU DID NOT HAVE A STEADY PLACE TO SLEEP OR SLEPT IN A SHELTER (INCLUDING NOW)?: NO

## 2023-07-05 ASSESSMENT — PATIENT HEALTH QUESTIONNAIRE - PHQ9
SUM OF ALL RESPONSES TO PHQ QUESTIONS 1-9: 0
SUM OF ALL RESPONSES TO PHQ QUESTIONS 1-9: 0
1. LITTLE INTEREST OR PLEASURE IN DOING THINGS: 0
SUM OF ALL RESPONSES TO PHQ9 QUESTIONS 1 & 2: 0
SUM OF ALL RESPONSES TO PHQ QUESTIONS 1-9: 0
2. FEELING DOWN, DEPRESSED OR HOPELESS: 0
SUM OF ALL RESPONSES TO PHQ QUESTIONS 1-9: 0

## 2023-07-05 ASSESSMENT — ENCOUNTER SYMPTOMS
SINUS PRESSURE: 0
SINUS PAIN: 0
CONSTIPATION: 0
DIARRHEA: 0
SHORTNESS OF BREATH: 0
VOMITING: 0
EYE PAIN: 0
BLOOD IN STOOL: 0
NAUSEA: 0
SORE THROAT: 0
ABDOMINAL PAIN: 0
COUGH: 0

## 2023-07-05 NOTE — PROGRESS NOTES
the past 15 years the grade B recommendation is to:  Screen for lung cancer with low-dose computed tomography (LDCT) every year. Stop screening once a person has not smoked for 15 years or has a health problem that limits life expectancy or the ability to have lung surgery. The patient  reports that he quit smoking about 8 years ago. His smoking use included cigarettes. He started smoking about 42 years ago. He has a 35.00 pack-year smoking history. He has never used smokeless tobacco.. Discussed with patient the risks and benefits of screening, including over-diagnosis, false positive rate, and total radiation exposure. The patient currently exhibits no signs or symptoms suggestive of lung cancer. Discussed with patient the importance of compliance with yearly annual lung cancer screenings and willingness to undergo diagnosis and treatment if screening scan is positive. In addition, the patient was counseled regarding the importance of remaining smoke free and/or total smoking cessation.     Also reviewed the following if the patient has Medicare that as of February 10, 2022, Medicare only covers LDCT screening in patients aged 53-69 with at least a 20 pack-year smoking history who currently smoke or have quit in the last 15 years

## 2023-07-06 LAB
ALBUMIN SERPL-MCNC: 3.7 G/DL (ref 3.5–5)
ALBUMIN/GLOB SERPL: 1.1 (ref 1.1–2.2)
ALP SERPL-CCNC: 69 U/L (ref 45–117)
ALT SERPL-CCNC: 19 U/L (ref 12–78)
ANION GAP SERPL CALC-SCNC: 4 MMOL/L (ref 5–15)
AST SERPL-CCNC: 12 U/L (ref 15–37)
BILIRUB SERPL-MCNC: 0.3 MG/DL (ref 0.2–1)
BUN SERPL-MCNC: 23 MG/DL (ref 6–20)
BUN/CREAT SERPL: 25 (ref 12–20)
CALCIUM SERPL-MCNC: 9.3 MG/DL (ref 8.5–10.1)
CHLORIDE SERPL-SCNC: 105 MMOL/L (ref 97–108)
CHOLEST SERPL-MCNC: 162 MG/DL
CO2 SERPL-SCNC: 28 MMOL/L (ref 21–32)
CREAT SERPL-MCNC: 0.93 MG/DL (ref 0.7–1.3)
GLOBULIN SER CALC-MCNC: 3.4 G/DL (ref 2–4)
GLUCOSE SERPL-MCNC: 103 MG/DL (ref 65–100)
HDLC SERPL-MCNC: 43 MG/DL
HDLC SERPL: 3.8 (ref 0–5)
LDLC SERPL CALC-MCNC: 97.8 MG/DL (ref 0–100)
POTASSIUM SERPL-SCNC: 4.3 MMOL/L (ref 3.5–5.1)
PROT SERPL-MCNC: 7.1 G/DL (ref 6.4–8.2)
PSA SERPL-MCNC: 0.8 NG/ML (ref 0.01–4)
SODIUM SERPL-SCNC: 137 MMOL/L (ref 136–145)
TRIGL SERPL-MCNC: 106 MG/DL
VLDLC SERPL CALC-MCNC: 21.2 MG/DL

## 2023-07-07 LAB — TSH SERPL DL<=0.05 MIU/L-ACNC: 0.93 UIU/ML (ref 0.45–4.5)

## 2023-08-21 DIAGNOSIS — I10 ESSENTIAL (PRIMARY) HYPERTENSION: ICD-10-CM

## 2023-08-21 DIAGNOSIS — E11.9 TYPE 2 DIABETES MELLITUS WITHOUT COMPLICATIONS (HCC): ICD-10-CM

## 2023-08-21 RX ORDER — LISINOPRIL 10 MG/1
TABLET ORAL
Qty: 90 TABLET | Refills: 1 | Status: SHIPPED | OUTPATIENT
Start: 2023-08-21

## 2023-08-21 RX ORDER — METFORMIN HYDROCHLORIDE 500 MG/1
TABLET, EXTENDED RELEASE ORAL
Qty: 90 TABLET | Refills: 1 | Status: SHIPPED | OUTPATIENT
Start: 2023-08-21

## 2023-10-11 ENCOUNTER — TELEPHONE (OUTPATIENT)
Age: 53
End: 2023-10-11

## 2023-10-11 NOTE — TELEPHONE ENCOUNTER
Patient has not been seen in 11 months, was a patient of Dr Rene Polk. Is not being followed by Dr Cyndy Mcconnell. Pt stated he has about 3 weeks of enbrel left and needs a refill of the enbrel sent.  Pt was informed by [facility] that the medication will not be refilled until pt is established with a new rheumatologist

## 2023-10-16 ENCOUNTER — TELEPHONE (OUTPATIENT)
Age: 53
End: 2023-10-16

## 2023-10-16 NOTE — TELEPHONE ENCOUNTER
CM called Wm Robbie Manrique of [facility], [phone], to inquire about program  CM spoke with Nyla Rios,   Nyla Rios will be faxing over application  At [place] [address] following is offered; Day programming: The doors open at 8am however members must arrive between 8:30-9am  The program provides transportation  The program runs from 9am to 3pm every day with two classes per time block  Members get to pick which classes they want to complete  Skills such as learning skills/life domains, coping, independence, socialization, etc  The program is a skilled based program and they must participate  Prior to COVID-19, the turn around time was 48 hours to set up an intake time but now is about 2 weeks  CM will complete and fax back to [phone]  CM called and left voicemail for [facility] Althea Sahni [phone], [facility] with [facility]  CM to await return phone call  CM will continue to follow and provide services as needed  PT called and requested for recent office notes, labs and demographics to be faxed to number provided     C:[phone]

## 2023-11-07 ENCOUNTER — TELEPHONE (OUTPATIENT)
Age: 53
End: 2023-11-07

## 2023-11-07 NOTE — TELEPHONE ENCOUNTER
----- Message from Elle Patton sent at 11/6/2023  2:08 PM EST -----  Subject: Referral Request    Reason for referral request? Joseph needs a referral faxed to [phone]   to [facility] rhemutology c/o Dept of rhematology for him to book a medical appt   to get his meds filled.  Provider patient wants to be referred to(if known):     Provider Phone Number(if known):    Additional Information for Provider?   ---------------------------------------------------------------------------  --------------  CALL BACK INFO    [phone]; OK to leave message on voicemail  ---------------------------------------------------------------------------  --------------

## 2023-11-08 DIAGNOSIS — M06.9 RHEUMATOID ARTHRITIS, INVOLVING UNSPECIFIED SITE, UNSPECIFIED WHETHER RHEUMATOID FACTOR PRESENT (HCC): Primary | ICD-10-CM

## 2023-11-10 ENCOUNTER — TELEPHONE (OUTPATIENT)
Age: 53
End: 2023-11-10

## 2023-12-13 ENCOUNTER — OFFICE VISIT (OUTPATIENT)
Age: 53
End: 2023-12-13
Payer: COMMERCIAL

## 2023-12-13 VITALS
DIASTOLIC BLOOD PRESSURE: 73 MMHG | SYSTOLIC BLOOD PRESSURE: 110 MMHG | BODY MASS INDEX: 36.28 KG/M2 | WEIGHT: 238.6 LBS | RESPIRATION RATE: 18 BRPM | TEMPERATURE: 98 F | OXYGEN SATURATION: 96 % | HEART RATE: 61 BPM

## 2023-12-13 DIAGNOSIS — E11.9 TYPE 2 DIABETES MELLITUS WITHOUT COMPLICATION, WITHOUT LONG-TERM CURRENT USE OF INSULIN (HCC): Primary | ICD-10-CM

## 2023-12-13 DIAGNOSIS — R63.5 WEIGHT GAIN: ICD-10-CM

## 2023-12-13 DIAGNOSIS — E66.01 SEVERE OBESITY (HCC): ICD-10-CM

## 2023-12-13 PROCEDURE — 3044F HG A1C LEVEL LT 7.0%: CPT | Performed by: PHYSICIAN ASSISTANT

## 2023-12-13 PROCEDURE — 99214 OFFICE O/P EST MOD 30 MIN: CPT | Performed by: PHYSICIAN ASSISTANT

## 2023-12-13 ASSESSMENT — ENCOUNTER SYMPTOMS
SHORTNESS OF BREATH: 0
COUGH: 0

## 2023-12-13 NOTE — PROGRESS NOTES
I have reviewed all needed documentation in preparation for visit. Verified patient by name and date of birth  Chief Complaint   Patient presents with    Follow-up     F/U, gaining weight unintentionally. There were no vitals filed for this visit. Health Maintenance Due   Topic Date Due    Hepatitis B vaccine (1 of 3 - 3-dose series) Never done    Diabetic retinal exam  Never done    Shingles vaccine (1 of 2) Never done    Low dose CT lung screening &/or counseling  Never done    Diabetic Alb to Cr ratio (uACR) test  10/06/2022    COVID-19 Vaccine (3 - 2023-24 season) 09/01/2023       1. \"Have you been to the ER, urgent care clinic since your last visit? Hospitalized since your last visit? \" No    2. \"Have you seen or consulted any other health care providers outside of the [address] [place] [address] Avenue since your last visit? \" Yes, [address] [place] [address] Rheumatologist.     3. For patients aged 43-73: Has the patient had a colonoscopy / FIT/ Cologuard? Yes, up to date. If the patient is female:    4. For patients aged 43-66: Has the patient had a mammogram within the past 2 years? NA      5. For patients aged 21-65: Has the patient had a pap smear?  NA        BERHANE Perez

## 2024-02-23 DIAGNOSIS — I10 ESSENTIAL (PRIMARY) HYPERTENSION: ICD-10-CM

## 2024-02-23 RX ORDER — LISINOPRIL 10 MG/1
TABLET ORAL
Qty: 90 TABLET | Refills: 1 | Status: SHIPPED | OUTPATIENT
Start: 2024-02-23

## 2024-02-26 DIAGNOSIS — E11.9 TYPE 2 DIABETES MELLITUS WITHOUT COMPLICATIONS (HCC): ICD-10-CM

## 2024-02-26 RX ORDER — METFORMIN HYDROCHLORIDE 500 MG/1
TABLET, EXTENDED RELEASE ORAL
Qty: 90 TABLET | Refills: 0 | Status: SHIPPED | OUTPATIENT
Start: 2024-02-26

## 2024-02-26 NOTE — TELEPHONE ENCOUNTER
Refill request received from [facility] for   Requested Prescriptions     Pending Prescriptions Disp Refills    metFORMIN (GLUCOPHAGE-XR) 500 MG extended release tablet [Pharmacy Med Name: METFORMIN HCL  MG TABLET] 90 tablet 1     Sig: TAKE 1 TABLET BY MOUTH DAILY (WITH DINNER). INDICATIONS: TYPE 2 DIABETES MELLITUS     Last office visit: 12/13/2023   Next office visit: Visit date not found     Routed to CHRISTIANE Frazier for review.

## 2024-05-19 DIAGNOSIS — E11.9 TYPE 2 DIABETES MELLITUS WITHOUT COMPLICATIONS (HCC): ICD-10-CM

## 2024-05-20 RX ORDER — METFORMIN HYDROCHLORIDE 500 MG/1
TABLET, EXTENDED RELEASE ORAL
Qty: 90 TABLET | Refills: 0 | Status: SHIPPED | OUTPATIENT
Start: 2024-05-20

## 2024-05-23 ENCOUNTER — TELEPHONE (OUTPATIENT)
Age: 54
End: 2024-05-23

## 2024-05-23 NOTE — TELEPHONE ENCOUNTER
Patient called regarding [facility]. Resent orientation, advised patient to view orientation in its entirety. Advised patient to send an email to the  for next steps

## 2024-08-12 DIAGNOSIS — E11.9 TYPE 2 DIABETES MELLITUS WITHOUT COMPLICATIONS (HCC): ICD-10-CM

## 2024-08-15 DIAGNOSIS — E11.9 TYPE 2 DIABETES MELLITUS WITHOUT COMPLICATIONS (HCC): ICD-10-CM

## 2024-08-15 RX ORDER — METFORMIN HCL 500 MG
TABLET, EXTENDED RELEASE 24 HR ORAL
Qty: 90 TABLET | Refills: 0 | OUTPATIENT
Start: 2024-08-15

## 2024-08-21 DIAGNOSIS — I10 ESSENTIAL (PRIMARY) HYPERTENSION: ICD-10-CM

## 2024-08-21 RX ORDER — LISINOPRIL 10 MG/1
TABLET ORAL
Qty: 90 TABLET | Refills: 0 | Status: SHIPPED | OUTPATIENT
Start: 2024-08-21

## 2024-08-24 SDOH — ECONOMIC STABILITY: INCOME INSECURITY: HOW HARD IS IT FOR YOU TO PAY FOR THE VERY BASICS LIKE FOOD, HOUSING, MEDICAL CARE, AND HEATING?: NOT HARD AT ALL

## 2024-08-24 SDOH — ECONOMIC STABILITY: TRANSPORTATION INSECURITY
IN THE PAST 12 MONTHS, HAS LACK OF TRANSPORTATION KEPT YOU FROM MEETINGS, WORK, OR FROM GETTING THINGS NEEDED FOR DAILY LIVING?: NO

## 2024-08-24 SDOH — ECONOMIC STABILITY: FOOD INSECURITY: WITHIN THE PAST 12 MONTHS, THE FOOD YOU BOUGHT JUST DIDN'T LAST AND YOU DIDN'T HAVE MONEY TO GET MORE.: NEVER TRUE

## 2024-08-24 SDOH — ECONOMIC STABILITY: FOOD INSECURITY: WITHIN THE PAST 12 MONTHS, YOU WORRIED THAT YOUR FOOD WOULD RUN OUT BEFORE YOU GOT MONEY TO BUY MORE.: NEVER TRUE

## 2024-08-26 ENCOUNTER — OFFICE VISIT (OUTPATIENT)
Age: 54
End: 2024-08-26
Payer: COMMERCIAL

## 2024-08-26 VITALS
OXYGEN SATURATION: 97 % | WEIGHT: 237 LBS | SYSTOLIC BLOOD PRESSURE: 102 MMHG | DIASTOLIC BLOOD PRESSURE: 67 MMHG | HEIGHT: 68 IN | BODY MASS INDEX: 35.92 KG/M2 | HEART RATE: 59 BPM | RESPIRATION RATE: 16 BRPM | TEMPERATURE: 97.9 F

## 2024-08-26 DIAGNOSIS — Z12.5 SCREENING PSA (PROSTATE SPECIFIC ANTIGEN): ICD-10-CM

## 2024-08-26 DIAGNOSIS — E66.01 SEVERE OBESITY (HCC): ICD-10-CM

## 2024-08-26 DIAGNOSIS — E11.9 TYPE 2 DIABETES MELLITUS WITHOUT COMPLICATION, WITHOUT LONG-TERM CURRENT USE OF INSULIN (HCC): Primary | ICD-10-CM

## 2024-08-26 DIAGNOSIS — E11.9 TYPE 2 DIABETES MELLITUS WITHOUT COMPLICATION, WITHOUT LONG-TERM CURRENT USE OF INSULIN (HCC): ICD-10-CM

## 2024-08-26 PROCEDURE — 99214 OFFICE O/P EST MOD 30 MIN: CPT | Performed by: PHYSICIAN ASSISTANT

## 2024-08-26 RX ORDER — METFORMIN HCL 500 MG
500 TABLET, EXTENDED RELEASE 24 HR ORAL
Qty: 90 TABLET | Refills: 1 | Status: SHIPPED | OUTPATIENT
Start: 2024-08-26

## 2024-08-26 RX ORDER — METFORMIN HCL 500 MG
TABLET, EXTENDED RELEASE 24 HR ORAL
Qty: 90 TABLET | Refills: 0 | OUTPATIENT
Start: 2024-08-26

## 2024-08-26 SDOH — ECONOMIC STABILITY: FOOD INSECURITY: WITHIN THE PAST 12 MONTHS, YOU WORRIED THAT YOUR FOOD WOULD RUN OUT BEFORE YOU GOT MONEY TO BUY MORE.: NEVER TRUE

## 2024-08-26 SDOH — ECONOMIC STABILITY: FOOD INSECURITY: WITHIN THE PAST 12 MONTHS, THE FOOD YOU BOUGHT JUST DIDN'T LAST AND YOU DIDN'T HAVE MONEY TO GET MORE.: NEVER TRUE

## 2024-08-26 SDOH — ECONOMIC STABILITY: INCOME INSECURITY: HOW HARD IS IT FOR YOU TO PAY FOR THE VERY BASICS LIKE FOOD, HOUSING, MEDICAL CARE, AND HEATING?: NOT HARD AT ALL

## 2024-08-26 ASSESSMENT — PATIENT HEALTH QUESTIONNAIRE - PHQ9
SUM OF ALL RESPONSES TO PHQ QUESTIONS 1-9: 1
SUM OF ALL RESPONSES TO PHQ9 QUESTIONS 1 & 2: 1
2. FEELING DOWN, DEPRESSED OR HOPELESS: SEVERAL DAYS
1. LITTLE INTEREST OR PLEASURE IN DOING THINGS: NOT AT ALL

## 2024-08-26 ASSESSMENT — ENCOUNTER SYMPTOMS
SHORTNESS OF BREATH: 0
COUGH: 0

## 2024-08-26 NOTE — PROGRESS NOTES
I have reviewed all needed documentation in preparation for visit. Verified patient by name and date of birth  Chief Complaint   Patient presents with    6 Month Follow-Up     Concern with weight and medication refill        Vitals:    08/26/24 1532   BP: 102/67   Site: Left Upper Arm   Position: Sitting   Cuff Size: Large Adult   Pulse: 59   Resp: 16   Temp: 97.9 °F (36.6 °C)   TempSrc: Temporal   SpO2: 97%   Weight: 107.5 kg (237 lb)   Height: 1.727 m (5' 8\")       Health Maintenance Due   Topic Date Due    Diabetic retinal exam  Never done    Lung Cancer Screening &/or Counseling  Never done    Diabetic Alb to Cr ratio (uACR) test  10/06/2022    COVID-19 Vaccine (4 - 2023-24 season) 09/01/2023    Diabetic foot exam  07/05/2024    A1C test (Diabetic or Prediabetic)  07/05/2024    Lipids  07/05/2024    Depression Screen  07/05/2024    GFR test (Diabetes, CKD 3-4, OR last GFR 15-59)  07/05/2024    Flu vaccine (1) 08/01/2024    Shingles vaccine (2 of 2) 08/05/2024     \"Have you been to the ER, urgent care clinic since your last visit?  Hospitalized since your last visit?\"    NO    “Have you seen or consulted any other health care providers outside of [facility] since your last visit?”    NO          Click Here for Release of Records Request         KATELYN Tenorio

## 2024-08-26 NOTE — PROGRESS NOTES
Joseph Ferguson Jr. is a 53 y.o. male  Chief Complaint   Patient presents with    6 Month Follow-Up     Concern with weight and medication refill      Vitals:    08/26/24 1532   BP: 102/67   Pulse: 59   Resp: 16   Temp: 97.9 °F (36.6 °C)   SpO2: 97%      Wt Readings from Last 3 Encounters:   08/26/24 107.5 kg (237 lb)   12/13/23 108.2 kg (238 lb 9.6 oz)   07/05/23 102 kg (224 lb 12.8 oz)     BMI Readings from Last 3 Encounters:   08/26/24 36.04 kg/m²   12/13/23 36.28 kg/m²   07/05/23 34.18 kg/m²     Health Maintenance Due   Topic Date Due    Diabetic retinal exam  Never done    Lung Cancer Screening &/or Counseling  Never done    Diabetic Alb to Cr ratio (uACR) test  10/06/2022    COVID-19 Vaccine (4 - 2023-24 season) 09/01/2023    Diabetic foot exam  07/05/2024    A1C test (Diabetic or Prediabetic)  07/05/2024    Lipids  07/05/2024    Depression Screen  07/05/2024    GFR test (Diabetes, CKD 3-4, OR last GFR 15-59)  07/05/2024    Flu vaccine (1) 08/01/2024    Shingles vaccine (2 of 2) 08/05/2024     HPI  DM II - at last check controlled. Feeling well.   Mounjaro covered by insurance but needs PA. Saw a weight loss MD who doesn't do PAs. Interested in starting Mounjaro if possible.     Hemoglobin A1C (%)   Date Value   07/05/2023 6.0 (H)     Lab Results   Component Value Date    LDL 97.8 07/05/2023     Creatinine (MG/DL)   Date Value   07/05/2023 0.93       Currently taking Diabetes Meds   Diabetic Medications       Biguanides       metFORMIN (GLUCOPHAGE-XR) 500 MG extended release tablet TAKE 1 TABLET BY MOUTH DAILY (WITH DINNER). INDICATIONS: TYPE 2 DIABETES MELLITUS          Wt Readings from Last 3 Encounters:   08/26/24 107.5 kg (237 lb)   12/13/23 108.2 kg (238 lb 9.6 oz)   07/05/23 102 kg (224 lb 12.8 oz)     ROS  Review of Systems   Constitutional:  Negative for fever.   Respiratory:  Negative for cough and shortness of breath.    Cardiovascular:  Negative for chest pain and palpitations.   Neurological:   Negative for headaches.   Psychiatric/Behavioral:  Negative for dysphoric mood.      EXAM  Physical Exam  Vitals and nursing note reviewed.   Constitutional:       Appearance: Normal appearance.   HENT:      Head: Normocephalic and atraumatic.   Neck:      Vascular: No carotid bruit.   Cardiovascular:      Rate and Rhythm: Normal rate and regular rhythm.      Heart sounds: Normal heart sounds.   Pulmonary:      Effort: Pulmonary effort is normal. No respiratory distress.      Breath sounds: Normal breath sounds.   Musculoskeletal:      Cervical back: Neck supple.   Skin:     General: Skin is warm and dry.   Neurological:      General: No focal deficit present.      Mental Status: He is alert and oriented to person, place, and time.   Psychiatric:         Mood and Affect: Mood normal.         Behavior: Behavior normal.         Thought Content: Thought content normal.         Judgment: Judgment normal.       ASSESSMENT/PLAN  Type 2 diabetes mellitus without complication, without long-term current use of insulin (HCC)  -     Hemoglobin A1C; Future  -     Comprehensive Metabolic Panel; Future  -     Lipid Panel; Future  -     Microalbumin / Creatinine Urine Ratio; Future  -     metFORMIN (GLUCOPHAGE-XR) 500 MG extended release tablet; Take 1 tablet by mouth daily (with breakfast), Disp-90 tablet, R-1Normal  Screening PSA (prostate specific antigen)  -     PSA Screening; Future  Severe obesity (HCC)  Pt agrees to do labs in the next 2 weeks. Sent myself a reminder to send Mounjaro on 9/9    Refills Sent.

## 2024-08-27 PROBLEM — G47.33 OSA ON CPAP: Status: ACTIVE | Noted: 2024-08-27

## 2024-08-31 LAB
ALBUMIN SERPL-MCNC: 4.2 G/DL (ref 3.8–4.9)
ALBUMIN/CREAT UR: <3 MG/G CREAT (ref 0–29)
ALP SERPL-CCNC: 77 IU/L (ref 44–121)
ALT SERPL-CCNC: 21 IU/L (ref 0–44)
AST SERPL-CCNC: 14 IU/L (ref 0–40)
BILIRUB SERPL-MCNC: 0.3 MG/DL (ref 0–1.2)
BUN SERPL-MCNC: 18 MG/DL (ref 6–24)
BUN/CREAT SERPL: 19 (ref 9–20)
CALCIUM SERPL-MCNC: 9.3 MG/DL (ref 8.7–10.2)
CHLORIDE SERPL-SCNC: 97 MMOL/L (ref 96–106)
CHOLEST SERPL-MCNC: 179 MG/DL (ref 100–199)
CO2 SERPL-SCNC: 23 MMOL/L (ref 20–29)
CREAT SERPL-MCNC: 0.94 MG/DL (ref 0.76–1.27)
CREAT UR-MCNC: 103.4 MG/DL
EGFRCR SERPLBLD CKD-EPI 2021: 97 ML/MIN/1.73
GLOBULIN SER CALC-MCNC: 3.4 G/DL (ref 1.5–4.5)
GLUCOSE SERPL-MCNC: 99 MG/DL (ref 70–99)
HBA1C MFR BLD: 7.2 % (ref 4.8–5.6)
HDLC SERPL-MCNC: 39 MG/DL
LDLC SERPL CALC-MCNC: 108 MG/DL (ref 0–99)
MICROALBUMIN UR-MCNC: <3 UG/ML
POTASSIUM SERPL-SCNC: 4.6 MMOL/L (ref 3.5–5.2)
PROT SERPL-MCNC: 7.6 G/DL (ref 6–8.5)
PSA SERPL-MCNC: 0.7 NG/ML (ref 0–4)
SODIUM SERPL-SCNC: 136 MMOL/L (ref 134–144)
SPECIMEN STATUS REPORT: NORMAL
TRIGL SERPL-MCNC: 180 MG/DL (ref 0–149)
VLDLC SERPL CALC-MCNC: 32 MG/DL (ref 5–40)

## 2024-09-03 ENCOUNTER — PATIENT MESSAGE (OUTPATIENT)
Age: 54
End: 2024-09-03

## 2024-09-03 DIAGNOSIS — E11.9 TYPE 2 DIABETES MELLITUS WITHOUT COMPLICATION, WITHOUT LONG-TERM CURRENT USE OF INSULIN (HCC): Primary | ICD-10-CM

## 2024-09-03 DIAGNOSIS — E66.01 SEVERE OBESITY (HCC): ICD-10-CM

## 2024-09-04 DIAGNOSIS — E66.01 SEVERE OBESITY (HCC): ICD-10-CM

## 2024-09-04 DIAGNOSIS — E11.9 TYPE 2 DIABETES MELLITUS WITHOUT COMPLICATION, WITHOUT LONG-TERM CURRENT USE OF INSULIN (HCC): ICD-10-CM

## 2024-09-04 RX ORDER — TIRZEPATIDE 2.5 MG/.5ML
2.5 INJECTION, SOLUTION SUBCUTANEOUS WEEKLY
Qty: 4 EACH | Refills: 1 | Status: SHIPPED | OUTPATIENT
Start: 2024-09-04 | End: 2024-09-10 | Stop reason: SDUPTHER

## 2024-09-10 RX ORDER — SEMAGLUTIDE 0.68 MG/ML
INJECTION, SOLUTION SUBCUTANEOUS
Refills: 1 | OUTPATIENT
Start: 2024-09-10

## 2024-09-10 RX ORDER — TIRZEPATIDE 2.5 MG/.5ML
2.5 INJECTION, SOLUTION SUBCUTANEOUS WEEKLY
Qty: 4 EACH | Refills: 1 | Status: SHIPPED | OUTPATIENT
Start: 2024-09-10

## 2024-09-23 ENCOUNTER — PATIENT MESSAGE (OUTPATIENT)
Age: 54
End: 2024-09-23

## 2024-09-23 DIAGNOSIS — E11.9 TYPE 2 DIABETES MELLITUS WITHOUT COMPLICATION, WITHOUT LONG-TERM CURRENT USE OF INSULIN (HCC): ICD-10-CM

## 2024-09-23 DIAGNOSIS — E66.01 SEVERE OBESITY: Primary | ICD-10-CM

## 2024-09-23 RX ORDER — TIRZEPATIDE 5 MG/.5ML
5 INJECTION, SOLUTION SUBCUTANEOUS WEEKLY
Qty: 4 ADJUSTABLE DOSE PRE-FILLED PEN SYRINGE | Refills: 1 | Status: SHIPPED | OUTPATIENT
Start: 2024-09-23

## 2024-10-29 ENCOUNTER — PATIENT MESSAGE (OUTPATIENT)
Age: 54
End: 2024-10-29

## 2024-10-29 DIAGNOSIS — E11.9 TYPE 2 DIABETES MELLITUS WITHOUT COMPLICATION, WITHOUT LONG-TERM CURRENT USE OF INSULIN (HCC): Primary | ICD-10-CM

## 2024-10-29 DIAGNOSIS — E66.01 SEVERE OBESITY: ICD-10-CM

## 2024-10-30 RX ORDER — TIRZEPATIDE 10 MG/.5ML
10 INJECTION, SOLUTION SUBCUTANEOUS WEEKLY
Qty: 2 ML | Refills: 0 | Status: SHIPPED | OUTPATIENT
Start: 2024-11-30

## 2024-10-30 RX ORDER — TIRZEPATIDE 15 MG/.5ML
15 INJECTION, SOLUTION SUBCUTANEOUS WEEKLY
Qty: 2 ML | Refills: 5 | Status: SHIPPED | OUTPATIENT
Start: 2024-12-30

## 2024-10-30 RX ORDER — TIRZEPATIDE 7.5 MG/.5ML
7.5 INJECTION, SOLUTION SUBCUTANEOUS WEEKLY
Qty: 2 ML | Refills: 0 | Status: SHIPPED | OUTPATIENT
Start: 2024-10-30

## 2024-11-15 DIAGNOSIS — I10 ESSENTIAL (PRIMARY) HYPERTENSION: ICD-10-CM

## 2024-11-17 RX ORDER — LISINOPRIL 10 MG/1
TABLET ORAL
Qty: 90 TABLET | Refills: 1 | Status: SHIPPED | OUTPATIENT
Start: 2024-11-17

## 2025-01-27 DIAGNOSIS — E66.01 SEVERE OBESITY: ICD-10-CM

## 2025-01-27 DIAGNOSIS — E11.9 TYPE 2 DIABETES MELLITUS WITHOUT COMPLICATION, WITHOUT LONG-TERM CURRENT USE OF INSULIN (HCC): ICD-10-CM

## 2025-01-27 RX ORDER — TIRZEPATIDE 7.5 MG/.5ML
7.5 INJECTION, SOLUTION SUBCUTANEOUS WEEKLY
Qty: 2 ML | Refills: 0 | Status: CANCELLED | OUTPATIENT
Start: 2025-01-27

## 2025-01-30 RX ORDER — TIRZEPATIDE 15 MG/.5ML
15 INJECTION, SOLUTION SUBCUTANEOUS WEEKLY
Qty: 2 ML | Refills: 5 | Status: SHIPPED | OUTPATIENT
Start: 2025-01-30

## 2025-02-17 ENCOUNTER — OFFICE VISIT (OUTPATIENT)
Age: 55
End: 2025-02-17

## 2025-02-17 VITALS
DIASTOLIC BLOOD PRESSURE: 65 MMHG | WEIGHT: 203 LBS | HEIGHT: 68 IN | RESPIRATION RATE: 18 BRPM | BODY MASS INDEX: 30.77 KG/M2 | OXYGEN SATURATION: 98 % | TEMPERATURE: 97.3 F | HEART RATE: 79 BPM | SYSTOLIC BLOOD PRESSURE: 106 MMHG

## 2025-02-17 DIAGNOSIS — I10 ESSENTIAL (PRIMARY) HYPERTENSION: ICD-10-CM

## 2025-02-17 DIAGNOSIS — Z00.00 ANNUAL PHYSICAL EXAM: Primary | ICD-10-CM

## 2025-02-17 DIAGNOSIS — E11.9 TYPE 2 DIABETES MELLITUS WITHOUT COMPLICATION, WITHOUT LONG-TERM CURRENT USE OF INSULIN (HCC): ICD-10-CM

## 2025-02-17 RX ORDER — METFORMIN HYDROCHLORIDE 500 MG/1
500 TABLET, EXTENDED RELEASE ORAL
Qty: 90 TABLET | Refills: 1 | Status: SHIPPED | OUTPATIENT
Start: 2025-02-17

## 2025-02-17 RX ORDER — CEPHRADINE 500 MG
600 CAPSULE ORAL 2 TIMES DAILY WITH MEALS
COMMUNITY

## 2025-02-17 SDOH — ECONOMIC STABILITY: FOOD INSECURITY: WITHIN THE PAST 12 MONTHS, YOU WORRIED THAT YOUR FOOD WOULD RUN OUT BEFORE YOU GOT MONEY TO BUY MORE.: NEVER TRUE

## 2025-02-17 SDOH — ECONOMIC STABILITY: FOOD INSECURITY: WITHIN THE PAST 12 MONTHS, THE FOOD YOU BOUGHT JUST DIDN'T LAST AND YOU DIDN'T HAVE MONEY TO GET MORE.: NEVER TRUE

## 2025-02-17 ASSESSMENT — ENCOUNTER SYMPTOMS
SHORTNESS OF BREATH: 0
COUGH: 0
ABDOMINAL PAIN: 0
VOMITING: 0
SINUS PRESSURE: 0
NAUSEA: 1
BLOOD IN STOOL: 0
BACK PAIN: 0
DIARRHEA: 0
CONSTIPATION: 0
SORE THROAT: 0
EYE PAIN: 0
SINUS PAIN: 0

## 2025-02-17 ASSESSMENT — PATIENT HEALTH QUESTIONNAIRE - PHQ9
SUM OF ALL RESPONSES TO PHQ9 QUESTIONS 1 & 2: 1
SUM OF ALL RESPONSES TO PHQ QUESTIONS 1-9: 1
2. FEELING DOWN, DEPRESSED OR HOPELESS: SEVERAL DAYS
SUM OF ALL RESPONSES TO PHQ QUESTIONS 1-9: 1
SUM OF ALL RESPONSES TO PHQ QUESTIONS 1-9: 1
1. LITTLE INTEREST OR PLEASURE IN DOING THINGS: NOT AT ALL
SUM OF ALL RESPONSES TO PHQ QUESTIONS 1-9: 1

## 2025-02-17 NOTE — PROGRESS NOTES
Joseph TOBI Ferguson Jr. is a 54 y.o. male  Chief Complaint   Patient presents with    Annual Exam     Talk about blood pressure medicine-      Vitals:    25 1312   BP: 106/65   Pulse: 79   Resp: 18   Temp: 97.3 °F (36.3 °C)   SpO2: 98%      Wt Readings from Last 3 Encounters:   25 92.1 kg (203 lb)   24 107.5 kg (237 lb)   23 108.2 kg (238 lb 9.6 oz)     BMI Readings from Last 3 Encounters:   25 30.87 kg/m²   24 36.04 kg/m²   23 36.28 kg/m²     Health Maintenance Due   Topic Date Due    Diabetic retinal exam  Never done    Lung Cancer Screening &/or Counseling  Never done    Diabetic foot exam  2024    Shingles vaccine (2 of 2) 2024    COVID-19 Vaccine (4 - 2024-25 season) 2024     Social History     Tobacco Use    Smoking status: Former     Current packs/day: 0.00     Average packs/day: 1 pack/day for 35.0 years (35.0 ttl pk-yrs)     Types: Cigarettes     Start date: 1981     Quit date: 2015     Years since quitting: 10.0    Smokeless tobacco: Never   Substance Use Topics    Alcohol use: Yes     Comment: 12 drinks two times per month      Family History   Problem Relation Age of Onset    Diabetes Paternal Grandfather 60    Alcohol Abuse Paternal Uncle     Cancer Paternal Uncle          at 60    Osteoarthritis Maternal Grandfather         ? rheumatoid    Osteoarthritis Maternal Grandmother     Hypertension Father         high blood pressure. did not treat.    Diabetes Father         did not treat it.    Alcohol Abuse Father     Stroke Father         Three strokes in a row.    High Blood Pressure Father     Other Mother         hypoglycemia/borderline DM    Prostate Cancer Neg Hx     Colon Cancer Neg Hx      HPI  Pt presents for an Annual Physical.   Losing weight on glp-1 and doing well. Pleased with progress.   Wt Readings from Last 3 Encounters:   25 92.1 kg (203 lb)   24 107.5 kg (237 lb)   23 108.2 kg (238 lb 9.6 oz)     CV

## 2025-02-17 NOTE — PROGRESS NOTES
I have reviewed all needed documentation in preparation for visit. Verified patient by name and date of birth  Chief Complaint   Patient presents with    Annual Exam     Talk about blood pressure medicine-        Vitals:    02/17/25 1312   BP: 106/65   Site: Right Upper Arm   Position: Sitting   Cuff Size: Medium Adult   Pulse: 79   Resp: 18   Temp: 97.3 °F (36.3 °C)   TempSrc: Temporal   SpO2: 98%   Weight: 92.1 kg (203 lb)   Height: 1.727 m (5' 8\")       Health Maintenance Due   Topic Date Due    Diabetic retinal exam  Never done    Lung Cancer Screening &/or Counseling  Never done    Diabetic foot exam  07/05/2024    Shingles vaccine (2 of 2) 08/05/2024    COVID-19 Vaccine (4 - 2024-25 season) 09/01/2024     \"Have you been to the ER, urgent care clinic since your last visit?  Hospitalized since your last visit?\"    NO    “Have you seen or consulted any other health care providers outside of [facility] since your last visit?”    NO          Click Here for Release of Records Request         Lay brewster CMA

## 2025-03-03 DIAGNOSIS — E66.01 SEVERE OBESITY: ICD-10-CM

## 2025-03-03 DIAGNOSIS — E11.9 TYPE 2 DIABETES MELLITUS WITHOUT COMPLICATION, WITHOUT LONG-TERM CURRENT USE OF INSULIN (HCC): ICD-10-CM

## 2025-03-03 RX ORDER — TIRZEPATIDE 15 MG/.5ML
15 INJECTION, SOLUTION SUBCUTANEOUS WEEKLY
Qty: 2 ML | Refills: 5 | Status: SHIPPED | OUTPATIENT
Start: 2025-03-03

## 2025-06-24 ENCOUNTER — TELEPHONE (OUTPATIENT)
Age: 55
End: 2025-06-24

## 2025-06-24 NOTE — TELEPHONE ENCOUNTER
Left second vm in attempt to reschedule patient for in clinic appointment with MAVIS Cabrera for today. Left number for patient to return call.

## 2025-06-24 NOTE — TELEPHONE ENCOUNTER
Patient was scheduled for a virtual visit with CHRISTIANE Lopez for today at 12:40 PM for \"Kidney stone attack and difficulty peeing.\" Per Dima, due to his symptoms patient needs to be seen in clinic. Because Lanny does not have any availability for today, patient can be scheduled with MAVIS Cabrera instead. Left vm and sent Ubi message informing patient. Sent link for patient to schedule in clinic visit with Annelise for today. Left number for questions/assistance with scheduling. Will attempt to contact patient again in hour or so.

## 2025-06-26 ENCOUNTER — OFFICE VISIT (OUTPATIENT)
Age: 55
End: 2025-06-26
Payer: COMMERCIAL

## 2025-06-26 VITALS
RESPIRATION RATE: 18 BRPM | TEMPERATURE: 97.5 F | SYSTOLIC BLOOD PRESSURE: 100 MMHG | BODY MASS INDEX: 28.64 KG/M2 | WEIGHT: 189 LBS | OXYGEN SATURATION: 98 % | DIASTOLIC BLOOD PRESSURE: 68 MMHG | HEART RATE: 59 BPM | HEIGHT: 68 IN

## 2025-06-26 DIAGNOSIS — Z12.5 SCREENING PSA (PROSTATE SPECIFIC ANTIGEN): ICD-10-CM

## 2025-06-26 DIAGNOSIS — Z00.00 ANNUAL PHYSICAL EXAM: ICD-10-CM

## 2025-06-26 DIAGNOSIS — E66.01 SEVERE OBESITY (HCC): ICD-10-CM

## 2025-06-26 DIAGNOSIS — E11.9 TYPE 2 DIABETES MELLITUS WITHOUT COMPLICATION, WITHOUT LONG-TERM CURRENT USE OF INSULIN (HCC): ICD-10-CM

## 2025-06-26 DIAGNOSIS — K21.9 GASTROESOPHAGEAL REFLUX DISEASE WITHOUT ESOPHAGITIS: ICD-10-CM

## 2025-06-26 DIAGNOSIS — Z87.442 HISTORY OF KIDNEY STONES: ICD-10-CM

## 2025-06-26 DIAGNOSIS — R82.90 ABNORMAL URINE SEDIMENT: ICD-10-CM

## 2025-06-26 DIAGNOSIS — N20.0 KIDNEY STONES: Primary | ICD-10-CM

## 2025-06-26 LAB
BILIRUBIN, URINE, POC: NEGATIVE
BLOOD URINE, POC: NEGATIVE
GLUCOSE URINE, POC: NEGATIVE
KETONES, URINE, POC: NEGATIVE
LEUKOCYTE ESTERASE, URINE, POC: NEGATIVE
NITRITE, URINE, POC: NEGATIVE
PH, URINE, POC: 6 (ref 4.6–8)
PROTEIN,URINE, POC: NEGATIVE
SPECIFIC GRAVITY, URINE, POC: 1.02 (ref 1–1.03)
URINALYSIS CLARITY, POC: CLEAR
URINALYSIS COLOR, POC: NORMAL
UROBILINOGEN, POC: NORMAL MG/DL

## 2025-06-26 PROCEDURE — 3074F SYST BP LT 130 MM HG: CPT | Performed by: PHYSICIAN ASSISTANT

## 2025-06-26 PROCEDURE — 81001 URINALYSIS AUTO W/SCOPE: CPT | Performed by: PHYSICIAN ASSISTANT

## 2025-06-26 PROCEDURE — 99214 OFFICE O/P EST MOD 30 MIN: CPT | Performed by: PHYSICIAN ASSISTANT

## 2025-06-26 PROCEDURE — 3078F DIAST BP <80 MM HG: CPT | Performed by: PHYSICIAN ASSISTANT

## 2025-06-26 RX ORDER — OMEPRAZOLE 20 MG/1
20 CAPSULE, DELAYED RELEASE ORAL
Qty: 90 CAPSULE | Refills: 3 | Status: SHIPPED | OUTPATIENT
Start: 2025-06-26

## 2025-06-26 RX ORDER — TIRZEPATIDE 15 MG/.5ML
15 INJECTION, SOLUTION SUBCUTANEOUS WEEKLY
Qty: 6 ML | Refills: 3 | Status: SHIPPED | OUTPATIENT
Start: 2025-06-26

## 2025-06-26 RX ORDER — TAMSULOSIN HYDROCHLORIDE 0.4 MG/1
0.4 CAPSULE ORAL DAILY
Qty: 30 CAPSULE | Refills: 1 | Status: SHIPPED | OUTPATIENT
Start: 2025-06-26

## 2025-06-26 ASSESSMENT — ENCOUNTER SYMPTOMS
COUGH: 0
SHORTNESS OF BREATH: 0

## 2025-06-26 NOTE — PROGRESS NOTES
I have reviewed all needed documentation in preparation for visit. Verified patient by name and date of birth  Chief Complaint   Patient presents with    Nephrolithiasis     Having trouble with urination- stared 2 weeks ago-        Vitals:    06/26/25 0758   BP: 100/68   BP Site: Left Upper Arm   Patient Position: Sitting   BP Cuff Size: Medium Adult   Pulse: 59   Resp: 18   Temp: 97.5 °F (36.4 °C)   TempSrc: Temporal   SpO2: 98%   Weight: 85.7 kg (189 lb)   Height: 1.727 m (5' 8\")       Health Maintenance Due   Topic Date Due    Diabetic retinal exam  Never done    Lung Cancer Screening &/or Counseling  Never done    Diabetic foot exam  07/05/2024    Shingles vaccine (2 of 2) 08/05/2024    COVID-19 Vaccine (4 - 2024-25 season) 09/01/2024     \"Have you been to the ER, urgent care clinic since your last visit?  Hospitalized since your last visit?\"    NO    “Have you seen or consulted any other health care providers outside of [facility] since your last visit?”    NO          Click Here for Release of Records Request         Lay brewster CMA  
for fever.   Respiratory:  Negative for cough and shortness of breath.    Cardiovascular:  Negative for chest pain and palpitations.   Genitourinary:         See HPI   Neurological:  Negative for headaches.   Psychiatric/Behavioral:  Negative for dysphoric mood.      EXAM  Physical Exam  Vitals and nursing note reviewed.   Constitutional:       General: He is not in acute distress.     Appearance: Normal appearance. He is not ill-appearing.   HENT:      Head: Normocephalic and atraumatic.      Mouth/Throat:      Mouth: Mucous membranes are moist.   Cardiovascular:      Rate and Rhythm: Normal rate and regular rhythm.      Heart sounds: Normal heart sounds.   Pulmonary:      Effort: Pulmonary effort is normal.      Breath sounds: Normal breath sounds.   Abdominal:      Tenderness: There is no right CVA tenderness or left CVA tenderness.   Musculoskeletal:      Cervical back: Normal range of motion and neck supple.   Skin:     General: Skin is warm and dry.   Neurological:      General: No focal deficit present.      Mental Status: He is alert and oriented to person, place, and time.   Psychiatric:         Mood and Affect: Mood normal.         Behavior: Behavior normal.         Thought Content: Thought content normal.         Judgment: Judgment normal.       ASSESSMENT/PLAN  Kidney stones  -     Ambulatory referral to Urology  -     tamsulosin (FLOMAX) 0.4 MG capsule; Take 1 capsule by mouth daily, Disp-30 capsule, R-1Normal  History of kidney stones  -     AMB POC URINALYSIS DIP STICK AUTO W/ MICRO  -     Ambulatory referral to Urology  Type 2 diabetes mellitus without complication, without long-term current use of insulin ([facility])  -     Tirzepatide (MOUNJARO) 15 MG/0.5ML SOAJ pen; Inject 15 mg into the skin once a week, Disp-6 mL, R-3Normal  Severe obesity (HCC)  -     Tirzepatide (MOUNJARO) 15 MG/0.5ML SOAJ pen; Inject 15 mg into the skin once a week, Disp-6 mL, R-3Normal  Screening PSA (prostate specific antigen)  -

## 2025-06-27 LAB
ALBUMIN SERPL-MCNC: 3.6 G/DL (ref 3.5–5)
ALBUMIN/GLOB SERPL: 1.1 (ref 1.1–2.2)
ALP SERPL-CCNC: 70 U/L (ref 45–117)
ALT SERPL-CCNC: 24 U/L (ref 12–78)
ANION GAP SERPL CALC-SCNC: 4 MMOL/L (ref 2–12)
AST SERPL-CCNC: 18 U/L (ref 15–37)
BILIRUB SERPL-MCNC: 0.5 MG/DL (ref 0.2–1)
BUN SERPL-MCNC: 17 MG/DL (ref 6–20)
BUN/CREAT SERPL: 19 (ref 12–20)
CALCIUM SERPL-MCNC: 9.3 MG/DL (ref 8.5–10.1)
CHLORIDE SERPL-SCNC: 106 MMOL/L (ref 97–108)
CHOLEST SERPL-MCNC: 145 MG/DL
CO2 SERPL-SCNC: 27 MMOL/L (ref 21–32)
CREAT SERPL-MCNC: 0.88 MG/DL (ref 0.7–1.3)
ERYTHROCYTE [DISTWIDTH] IN BLOOD BY AUTOMATED COUNT: 13.4 % (ref 11.5–14.5)
EST. AVERAGE GLUCOSE BLD GHB EST-MCNC: 108 MG/DL
GLOBULIN SER CALC-MCNC: 3.4 G/DL (ref 2–4)
GLUCOSE SERPL-MCNC: 82 MG/DL (ref 65–100)
HBA1C MFR BLD: 5.4 % (ref 4–5.6)
HCT VFR BLD AUTO: 41.1 % (ref 36.6–50.3)
HDLC SERPL-MCNC: 43 MG/DL
HDLC SERPL: 3.4 (ref 0–5)
HGB BLD-MCNC: 13.2 G/DL (ref 12.1–17)
LDLC SERPL CALC-MCNC: 90 MG/DL (ref 0–100)
MCH RBC QN AUTO: 30.6 PG (ref 26–34)
MCHC RBC AUTO-ENTMCNC: 32.1 G/DL (ref 30–36.5)
MCV RBC AUTO: 95.4 FL (ref 80–99)
NRBC # BLD: 0 K/UL (ref 0–0.01)
NRBC BLD-RTO: 0 PER 100 WBC
PLATELET # BLD AUTO: 264 K/UL (ref 150–400)
PMV BLD AUTO: 11.1 FL (ref 8.9–12.9)
POTASSIUM SERPL-SCNC: 4.5 MMOL/L (ref 3.5–5.1)
PROT SERPL-MCNC: 7 G/DL (ref 6.4–8.2)
PSA SERPL-MCNC: 1.3 NG/ML (ref 0.01–4)
RBC # BLD AUTO: 4.31 M/UL (ref 4.1–5.7)
SODIUM SERPL-SCNC: 137 MMOL/L (ref 136–145)
TRIGL SERPL-MCNC: 60 MG/DL
VLDLC SERPL CALC-MCNC: 12 MG/DL
WBC # BLD AUTO: 7 K/UL (ref 4.1–11.1)

## 2025-06-30 ENCOUNTER — RESULTS FOLLOW-UP (OUTPATIENT)
Age: 55
End: 2025-06-30

## 2025-06-30 LAB — TSH SERPL DL<=0.05 MIU/L-ACNC: 0.62 UIU/ML (ref 0.45–4.5)

## 2025-07-19 DIAGNOSIS — N20.0 KIDNEY STONES: ICD-10-CM

## 2025-07-21 RX ORDER — TAMSULOSIN HYDROCHLORIDE 0.4 MG/1
CAPSULE ORAL DAILY
Qty: 90 CAPSULE | Refills: 3 | Status: SHIPPED | OUTPATIENT
Start: 2025-07-21